# Patient Record
Sex: MALE | Race: BLACK OR AFRICAN AMERICAN | NOT HISPANIC OR LATINO | Employment: FULL TIME | URBAN - METROPOLITAN AREA
[De-identification: names, ages, dates, MRNs, and addresses within clinical notes are randomized per-mention and may not be internally consistent; named-entity substitution may affect disease eponyms.]

---

## 2017-11-20 ENCOUNTER — HISTORICAL (OUTPATIENT)
Dept: LAB | Facility: HOSPITAL | Age: 47
End: 2017-11-20

## 2017-11-20 LAB
ABS NEUT (OLG): 5.7 X10(3)/MCL (ref 2.1–9.2)
ALBUMIN SERPL-MCNC: 4 GM/DL (ref 3.4–5)
ALBUMIN/GLOB SERPL: 1.1 RATIO (ref 1.1–2)
ALP SERPL-CCNC: 69 UNIT/L (ref 46–116)
ALT SERPL-CCNC: 34 UNIT/L (ref 12–78)
APPEARANCE, UA: CLEAR
AST SERPL-CCNC: 32 UNIT/L (ref 15–37)
BACTERIA SPEC CULT: NORMAL
BASOPHILS NFR BLD AUTO: 1 % (ref 0–2)
BILIRUB SERPL-MCNC: 0.2 MG/DL (ref 0.2–1)
BILIRUB UR QL STRIP: NEGATIVE
BILIRUBIN DIRECT+TOT PNL SERPL-MCNC: 0.08 MG/DL (ref 0–0.2)
BILIRUBIN DIRECT+TOT PNL SERPL-MCNC: 0.16 MG/DL (ref 0–0.8)
BUN SERPL-MCNC: 16.9 MG/DL (ref 7–18)
CALCIUM SERPL-MCNC: 9.7 MG/DL (ref 8.5–10.1)
CHLORIDE SERPL-SCNC: 105 MMOL/L (ref 98–107)
CHOLEST SERPL-MCNC: 173 MG/DL (ref 0–200)
CHOLEST/HDLC SERPL: 3.7 {RATIO} (ref 0–5)
CO2 SERPL-SCNC: 25.8 MMOL/L (ref 21–32)
COLOR UR: YELLOW
CREAT SERPL-MCNC: 1.01 MG/DL (ref 0.6–1.3)
EOSINOPHIL # BLD AUTO: 0.2 X10(3)/MCL
EOSINOPHIL NFR BLD AUTO: 2 %
ERYTHROCYTE [DISTWIDTH] IN BLOOD BY AUTOMATED COUNT: 13.9 % (ref 11.5–17)
GLOBULIN SER-MCNC: 3.6 GM/DL (ref 2.4–3.5)
GLUCOSE (UA): NEGATIVE
GLUCOSE SERPL-MCNC: 106 MG/DL (ref 74–106)
HCT VFR BLD AUTO: 41.8 % (ref 42–52)
HDLC SERPL-MCNC: 47 MG/DL (ref 40–60)
HGB BLD-MCNC: 13.9 GM/DL (ref 14–18)
HGB UR QL STRIP: NEGATIVE
KETONES UR QL STRIP: NEGATIVE
LDLC SERPL CALC-MCNC: 105 MG/DL (ref 0–129)
LEUKOCYTE ESTERASE UR QL STRIP: NEGATIVE
LYMPHOCYTES # BLD AUTO: 2.5 X10(3)/MCL
LYMPHOCYTES NFR BLD AUTO: 28 % (ref 13–40)
MCH RBC QN AUTO: 27.4 PG (ref 27–31)
MCHC RBC AUTO-ENTMCNC: 33.2 GM/DL (ref 33–36)
MCV RBC AUTO: 82.6 FL (ref 80–94)
MONOCYTES # BLD AUTO: 0.6 X10(3)/MCL
MONOCYTES NFR BLD AUTO: 6 % (ref 2–11)
NEUTROPHILS # BLD AUTO: 5.7 X10(3)/MCL (ref 2.1–9.2)
NEUTROPHILS NFR BLD AUTO: 63 % (ref 47–80)
NITRITE UR QL STRIP: NEGATIVE
PH UR STRIP: 5.5 [PH] (ref 5–9)
PLATELET # BLD AUTO: 276 X10(3)/MCL (ref 130–400)
PMV BLD AUTO: 7.4 FL (ref 7.4–10.4)
POTASSIUM SERPL-SCNC: 4.2 MMOL/L (ref 3.5–5.1)
PROT SERPL-MCNC: 7.6 GM/DL (ref 6.4–8.2)
PROT UR QL STRIP: NEGATIVE
PSA SERPL-MCNC: 1.09 NG/ML (ref 0–4)
RBC # BLD AUTO: 5.06 X10(6)/MCL (ref 4.7–6.1)
RBC #/AREA URNS HPF: NORMAL /[HPF]
SODIUM SERPL-SCNC: 141 MMOL/L (ref 136–145)
SP GR UR STRIP: 1.02 (ref 1–1.03)
SQUAMOUS EPITHELIAL, UA: NORMAL
TRIGL SERPL-MCNC: 104 MG/DL
UROBILINOGEN UR STRIP-ACNC: 0.2
VLDLC SERPL CALC-MCNC: 21 MG/DL
WBC # SPEC AUTO: 9 X10(3)/MCL (ref 4.5–11.5)
WBC #/AREA URNS HPF: NORMAL /[HPF]

## 2018-11-09 ENCOUNTER — HISTORICAL (OUTPATIENT)
Dept: LAB | Facility: HOSPITAL | Age: 48
End: 2018-11-09

## 2018-11-09 LAB
ABS NEUT (OLG): 6.23 X10(3)/MCL (ref 2.1–9.2)
ALBUMIN SERPL-MCNC: 4.1 GM/DL (ref 3.4–5)
ALBUMIN/GLOB SERPL: 1.1 RATIO (ref 1.1–2)
ALP SERPL-CCNC: 70 UNIT/L (ref 46–116)
ALT SERPL-CCNC: 33 UNIT/L (ref 12–78)
APPEARANCE, UA: CLEAR
AST SERPL-CCNC: 20 UNIT/L (ref 15–37)
BACTERIA SPEC CULT: NORMAL
BASOPHILS # BLD AUTO: 0 X10(3)/MCL (ref 0–0.2)
BASOPHILS NFR BLD AUTO: 0 %
BILIRUB SERPL-MCNC: 0.3 MG/DL (ref 0.2–1)
BILIRUB UR QL STRIP: NEGATIVE
BILIRUBIN DIRECT+TOT PNL SERPL-MCNC: 0.08 MG/DL (ref 0–0.2)
BILIRUBIN DIRECT+TOT PNL SERPL-MCNC: 0.22 MG/DL (ref 0–0.8)
BUN SERPL-MCNC: 12.3 MG/DL (ref 7–18)
CALCIUM SERPL-MCNC: 9.6 MG/DL (ref 8.5–10.1)
CHLORIDE SERPL-SCNC: 103 MMOL/L (ref 98–107)
CHOLEST SERPL-MCNC: 194 MG/DL (ref 0–200)
CHOLEST/HDLC SERPL: 4.3 {RATIO} (ref 0–5)
CO2 SERPL-SCNC: 27.1 MMOL/L (ref 21–32)
COLOR UR: YELLOW
CREAT SERPL-MCNC: 0.93 MG/DL (ref 0.6–1.3)
EOSINOPHIL # BLD AUTO: 0.2 X10(3)/MCL (ref 0–0.9)
EOSINOPHIL NFR BLD AUTO: 2 %
ERYTHROCYTE [DISTWIDTH] IN BLOOD BY AUTOMATED COUNT: 12.9 % (ref 11.5–17)
GLOBULIN SER-MCNC: 3.7 GM/DL (ref 2.4–3.5)
GLUCOSE (UA): NEGATIVE
GLUCOSE SERPL-MCNC: 98 MG/DL (ref 74–106)
HCT VFR BLD AUTO: 42 % (ref 42–52)
HDLC SERPL-MCNC: 45 MG/DL (ref 40–60)
HGB BLD-MCNC: 13.6 GM/DL (ref 14–18)
HGB UR QL STRIP: NEGATIVE
IMM GRANULOCYTES # BLD AUTO: 0.03 % (ref 0–0.02)
IMM GRANULOCYTES NFR BLD AUTO: 0.3 % (ref 0–0.43)
KETONES UR QL STRIP: NEGATIVE
LDLC SERPL CALC-MCNC: 135 MG/DL (ref 0–129)
LEUKOCYTE ESTERASE UR QL STRIP: NEGATIVE
LYMPHOCYTES # BLD AUTO: 2.5 X10(3)/MCL (ref 0.6–4.6)
LYMPHOCYTES NFR BLD AUTO: 27 %
MCH RBC QN AUTO: 26.9 PG (ref 27–31)
MCHC RBC AUTO-ENTMCNC: 32.4 GM/DL (ref 33–36)
MCV RBC AUTO: 83 FL (ref 80–94)
MONOCYTES # BLD AUTO: 0.5 X10(3)/MCL (ref 0.1–1.3)
MONOCYTES NFR BLD AUTO: 5 %
NEUTROPHILS # BLD AUTO: 6.23 X10(3)/MCL (ref 1.4–7.9)
NEUTROPHILS NFR BLD AUTO: 66 %
NITRITE UR QL STRIP: NEGATIVE
PH UR STRIP: 5 [PH] (ref 5–9)
PLATELET # BLD AUTO: 323 X10(3)/MCL (ref 130–400)
PMV BLD AUTO: 9.4 FL (ref 9.4–12.4)
POTASSIUM SERPL-SCNC: 4.2 MMOL/L (ref 3.5–5.1)
PROT SERPL-MCNC: 7.8 GM/DL (ref 6.4–8.2)
PROT UR QL STRIP: NEGATIVE
PSA SERPL-MCNC: 1.56 NG/ML (ref 0–4)
RBC # BLD AUTO: 5.06 X10(6)/MCL (ref 4.7–6.1)
RBC #/AREA URNS HPF: NORMAL /[HPF]
SODIUM SERPL-SCNC: 139 MMOL/L (ref 136–145)
SP GR UR STRIP: >=1.03 (ref 1–1.03)
SQUAMOUS EPITHELIAL, UA: NORMAL
TRIGL SERPL-MCNC: 71 MG/DL
UROBILINOGEN UR STRIP-ACNC: 0.2
VLDLC SERPL CALC-MCNC: 14 MG/DL
WBC # SPEC AUTO: 9.5 X10(3)/MCL (ref 4.5–11.5)
WBC #/AREA URNS HPF: NORMAL /[HPF]

## 2018-11-15 ENCOUNTER — HISTORICAL (OUTPATIENT)
Dept: ADMINISTRATIVE | Facility: HOSPITAL | Age: 48
End: 2018-11-15

## 2020-08-13 ENCOUNTER — HISTORICAL (OUTPATIENT)
Dept: LAB | Facility: HOSPITAL | Age: 50
End: 2020-08-13

## 2020-08-13 LAB
ABS NEUT (OLG): 4.98 X10(3)/MCL (ref 2.1–9.2)
ALBUMIN SERPL-MCNC: 4.2 GM/DL (ref 3.4–5)
ALBUMIN/GLOB SERPL: 1.1 RATIO (ref 1.1–2)
ALP SERPL-CCNC: 63 UNIT/L (ref 46–116)
ALT SERPL-CCNC: 39 UNIT/L (ref 12–78)
APPEARANCE, UA: CLEAR
AST SERPL-CCNC: 23 UNIT/L (ref 15–37)
BACTERIA SPEC CULT: NORMAL
BASOPHILS # BLD AUTO: 0 X10(3)/MCL (ref 0–0.2)
BASOPHILS NFR BLD AUTO: 0 %
BILIRUB SERPL-MCNC: 0.3 MG/DL (ref 0.2–1)
BILIRUB UR QL STRIP: NEGATIVE
BILIRUBIN DIRECT+TOT PNL SERPL-MCNC: 0.14 MG/DL (ref 0–0.8)
BILIRUBIN DIRECT+TOT PNL SERPL-MCNC: 0.16 MG/DL (ref 0–0.2)
BUN SERPL-MCNC: 12.8 MG/DL (ref 7–18)
CALCIUM SERPL-MCNC: 9.3 MG/DL (ref 8.5–10.1)
CHLORIDE SERPL-SCNC: 104 MMOL/L (ref 98–107)
CHOLEST SERPL-MCNC: 200 MG/DL (ref 0–200)
CHOLEST/HDLC SERPL: 4.3 {RATIO} (ref 0–5)
CO2 SERPL-SCNC: 28 MMOL/L (ref 21–32)
COLOR UR: YELLOW
CREAT SERPL-MCNC: 0.84 MG/DL (ref 0.6–1.3)
EOSINOPHIL # BLD AUTO: 0.2 X10(3)/MCL (ref 0–0.9)
EOSINOPHIL NFR BLD AUTO: 2 %
ERYTHROCYTE [DISTWIDTH] IN BLOOD BY AUTOMATED COUNT: 13.8 % (ref 11.5–17)
GLOBULIN SER-MCNC: 3.9 GM/DL (ref 2.4–3.5)
GLUCOSE (UA): NEGATIVE
GLUCOSE SERPL-MCNC: 90 MG/DL (ref 74–106)
HCT VFR BLD AUTO: 41.4 % (ref 42–52)
HDLC SERPL-MCNC: 46 MG/DL (ref 40–60)
HGB BLD-MCNC: 13.3 GM/DL (ref 14–18)
HGB UR QL STRIP: NEGATIVE
IMM GRANULOCYTES # BLD AUTO: 0.02 % (ref 0–0.02)
IMM GRANULOCYTES NFR BLD AUTO: 0.2 % (ref 0–0.43)
KETONES UR QL STRIP: NEGATIVE
LDLC SERPL CALC-MCNC: 143 MG/DL (ref 0–129)
LEUKOCYTE ESTERASE UR QL STRIP: NEGATIVE
LYMPHOCYTES # BLD AUTO: 3.1 X10(3)/MCL (ref 0.6–4.6)
LYMPHOCYTES NFR BLD AUTO: 35 %
MCH RBC QN AUTO: 26.2 PG (ref 27–31)
MCHC RBC AUTO-ENTMCNC: 32.1 GM/DL (ref 33–36)
MCV RBC AUTO: 81.7 FL (ref 80–94)
MONOCYTES # BLD AUTO: 0.5 X10(3)/MCL (ref 0.1–1.3)
MONOCYTES NFR BLD AUTO: 6 %
NEUTROPHILS # BLD AUTO: 4.98 X10(3)/MCL (ref 1.4–7.9)
NEUTROPHILS NFR BLD AUTO: 56 %
NITRITE UR QL STRIP: NEGATIVE
PH UR STRIP: 5.5 [PH] (ref 5–9)
PLATELET # BLD AUTO: 315 X10(3)/MCL (ref 130–400)
PMV BLD AUTO: 10 FL (ref 9.4–12.4)
POTASSIUM SERPL-SCNC: 3.9 MMOL/L (ref 3.5–5.1)
PROT SERPL-MCNC: 8.1 GM/DL (ref 6.4–8.2)
PROT UR QL STRIP: NEGATIVE
PSA SERPL-MCNC: 0.75 NG/ML (ref 0–4)
RBC # BLD AUTO: 5.07 X10(6)/MCL (ref 4.7–6.1)
RBC #/AREA URNS HPF: NORMAL /[HPF]
SODIUM SERPL-SCNC: 140 MMOL/L (ref 136–145)
SP GR UR STRIP: >=1.03 (ref 1–1.03)
SQUAMOUS EPITHELIAL, UA: NORMAL
TRIGL SERPL-MCNC: 57 MG/DL
UROBILINOGEN UR STRIP-ACNC: 0.2
VLDLC SERPL CALC-MCNC: 11 MG/DL
WBC # SPEC AUTO: 8.9 X10(3)/MCL (ref 4.5–11.5)
WBC #/AREA URNS HPF: NORMAL /[HPF]

## 2020-10-28 LAB — CRC RECOMMENDATION EXT: NORMAL

## 2021-12-02 ENCOUNTER — HISTORICAL (OUTPATIENT)
Dept: LAB | Facility: HOSPITAL | Age: 51
End: 2021-12-02

## 2021-12-02 LAB
ABS NEUT (OLG): 5.2 X10(3)/MCL (ref 2.1–9.2)
ALBUMIN SERPL-MCNC: 4.1 GM/DL (ref 3.5–5)
ALBUMIN/GLOB SERPL: 1.2 RATIO (ref 1.1–2)
ALP SERPL-CCNC: 74 UNIT/L (ref 40–150)
ALT SERPL-CCNC: 29 UNIT/L (ref 0–55)
APPEARANCE, UA: CLEAR
AST SERPL-CCNC: 21 UNIT/L (ref 5–34)
BACTERIA SPEC CULT: NORMAL
BASOPHILS # BLD AUTO: 0 X10(3)/MCL (ref 0–0.2)
BASOPHILS NFR BLD AUTO: 1 %
BILIRUB SERPL-MCNC: 0.3 MG/DL
BILIRUB UR QL STRIP: NEGATIVE
BILIRUBIN DIRECT+TOT PNL SERPL-MCNC: 0.1 MG/DL (ref 0–0.5)
BILIRUBIN DIRECT+TOT PNL SERPL-MCNC: 0.2 MG/DL (ref 0–0.8)
BUN SERPL-MCNC: 11.1 MG/DL (ref 8.4–25.7)
CALCIUM SERPL-MCNC: 9.6 MG/DL (ref 8.7–10.5)
CHLORIDE SERPL-SCNC: 106 MMOL/L (ref 98–107)
CHOLEST SERPL-MCNC: 194 MG/DL
CHOLEST/HDLC SERPL: 5 {RATIO} (ref 0–5)
CO2 SERPL-SCNC: 25 MMOL/L (ref 22–29)
COLOR UR: YELLOW
CREAT SERPL-MCNC: 0.9 MG/DL (ref 0.73–1.18)
EOSINOPHIL # BLD AUTO: 0.2 X10(3)/MCL (ref 0–0.9)
EOSINOPHIL NFR BLD AUTO: 2 %
ERYTHROCYTE [DISTWIDTH] IN BLOOD BY AUTOMATED COUNT: 13.5 % (ref 11.5–17)
GLOBULIN SER-MCNC: 3.4 GM/DL (ref 2.4–3.5)
GLUCOSE (UA): NEGATIVE
GLUCOSE SERPL-MCNC: 113 MG/DL (ref 74–100)
HCT VFR BLD AUTO: 40.9 % (ref 42–52)
HDLC SERPL-MCNC: 43 MG/DL (ref 35–60)
HGB BLD-MCNC: 13.2 GM/DL (ref 14–18)
HGB UR QL STRIP: NEGATIVE
IMM GRANULOCYTES # BLD AUTO: 0.01 % (ref 0–0.02)
IMM GRANULOCYTES NFR BLD AUTO: 0.1 % (ref 0–0.43)
KETONES UR QL STRIP: NEGATIVE
LDLC SERPL CALC-MCNC: 125 MG/DL (ref 50–140)
LEUKOCYTE ESTERASE UR QL STRIP: NEGATIVE
LYMPHOCYTES # BLD AUTO: 2.6 X10(3)/MCL (ref 0.6–4.6)
LYMPHOCYTES NFR BLD AUTO: 30 %
MCH RBC QN AUTO: 26 PG (ref 27–31)
MCHC RBC AUTO-ENTMCNC: 32.3 GM/DL (ref 33–36)
MCV RBC AUTO: 80.7 FL (ref 80–94)
MONOCYTES # BLD AUTO: 0.6 X10(3)/MCL (ref 0.1–1.3)
MONOCYTES NFR BLD AUTO: 6 %
NEUTROPHILS # BLD AUTO: 5.2 X10(3)/MCL (ref 1.4–7.9)
NEUTROPHILS NFR BLD AUTO: 60 %
NITRITE UR QL STRIP: NEGATIVE
PH UR STRIP: 6 [PH] (ref 5–9)
PLATELET # BLD AUTO: 331 X10(3)/MCL (ref 130–400)
PMV BLD AUTO: 9.2 FL (ref 9.4–12.4)
POTASSIUM SERPL-SCNC: 4.3 MMOL/L (ref 3.5–5.1)
PROT SERPL-MCNC: 7.5 GM/DL (ref 6.4–8.3)
PROT UR QL STRIP: NEGATIVE
PSA SERPL-MCNC: 1.48 NG/ML
RBC # BLD AUTO: 5.07 X10(6)/MCL (ref 4.7–6.1)
RBC #/AREA URNS HPF: NORMAL /[HPF]
SODIUM SERPL-SCNC: 139 MMOL/L (ref 136–145)
SP GR UR STRIP: >=1.03 (ref 1–1.03)
SQUAMOUS EPITHELIAL, UA: NORMAL
TRIGL SERPL-MCNC: 128 MG/DL (ref 34–140)
UROBILINOGEN UR STRIP-ACNC: 0.2
VLDLC SERPL CALC-MCNC: 26 MG/DL
WBC # SPEC AUTO: 8.6 X10(3)/MCL (ref 4.5–11.5)
WBC #/AREA URNS HPF: NORMAL /[HPF]

## 2022-04-10 ENCOUNTER — HISTORICAL (OUTPATIENT)
Dept: ADMINISTRATIVE | Facility: HOSPITAL | Age: 52
End: 2022-04-10

## 2022-04-28 VITALS
OXYGEN SATURATION: 98 % | WEIGHT: 220.56 LBS | HEIGHT: 72 IN | SYSTOLIC BLOOD PRESSURE: 123 MMHG | BODY MASS INDEX: 29.87 KG/M2 | DIASTOLIC BLOOD PRESSURE: 84 MMHG

## 2022-06-22 RX ORDER — LOSARTAN POTASSIUM 25 MG/1
25 TABLET ORAL DAILY
COMMUNITY
Start: 2021-10-13 | End: 2022-07-22 | Stop reason: SDUPTHER

## 2022-06-23 ENCOUNTER — OFFICE VISIT (OUTPATIENT)
Dept: INTERNAL MEDICINE | Facility: CLINIC | Age: 52
End: 2022-06-23
Payer: COMMERCIAL

## 2022-06-23 VITALS
RESPIRATION RATE: 18 BRPM | WEIGHT: 218.81 LBS | HEIGHT: 72 IN | DIASTOLIC BLOOD PRESSURE: 78 MMHG | HEART RATE: 64 BPM | TEMPERATURE: 97 F | SYSTOLIC BLOOD PRESSURE: 130 MMHG | BODY MASS INDEX: 29.64 KG/M2

## 2022-06-23 DIAGNOSIS — Z00.00 WELLNESS EXAMINATION: ICD-10-CM

## 2022-06-23 DIAGNOSIS — K40.90 HERNIA, INGUINAL, LEFT: Primary | ICD-10-CM

## 2022-06-23 DIAGNOSIS — K40.90 NON-RECURRENT UNILATERAL INGUINAL HERNIA WITHOUT OBSTRUCTION OR GANGRENE: Primary | ICD-10-CM

## 2022-06-23 DIAGNOSIS — D64.9 ANEMIA, UNSPECIFIED TYPE: ICD-10-CM

## 2022-06-23 PROCEDURE — 3075F SYST BP GE 130 - 139MM HG: CPT | Mod: CPTII,,, | Performed by: INTERNAL MEDICINE

## 2022-06-23 PROCEDURE — 3075F PR MOST RECENT SYSTOLIC BLOOD PRESS GE 130-139MM HG: ICD-10-PCS | Mod: CPTII,,, | Performed by: INTERNAL MEDICINE

## 2022-06-23 PROCEDURE — 4010F ACE/ARB THERAPY RXD/TAKEN: CPT | Mod: CPTII,,, | Performed by: INTERNAL MEDICINE

## 2022-06-23 PROCEDURE — 1160F RVW MEDS BY RX/DR IN RCRD: CPT | Mod: CPTII,,, | Performed by: INTERNAL MEDICINE

## 2022-06-23 PROCEDURE — 1159F PR MEDICATION LIST DOCUMENTED IN MEDICAL RECORD: ICD-10-PCS | Mod: CPTII,,, | Performed by: INTERNAL MEDICINE

## 2022-06-23 PROCEDURE — 3008F BODY MASS INDEX DOCD: CPT | Mod: CPTII,,, | Performed by: INTERNAL MEDICINE

## 2022-06-23 PROCEDURE — 3008F PR BODY MASS INDEX (BMI) DOCUMENTED: ICD-10-PCS | Mod: CPTII,,, | Performed by: INTERNAL MEDICINE

## 2022-06-23 PROCEDURE — 3078F PR MOST RECENT DIASTOLIC BLOOD PRESSURE < 80 MM HG: ICD-10-PCS | Mod: CPTII,,, | Performed by: INTERNAL MEDICINE

## 2022-06-23 PROCEDURE — 1159F MED LIST DOCD IN RCRD: CPT | Mod: CPTII,,, | Performed by: INTERNAL MEDICINE

## 2022-06-23 PROCEDURE — 3078F DIAST BP <80 MM HG: CPT | Mod: CPTII,,, | Performed by: INTERNAL MEDICINE

## 2022-06-23 PROCEDURE — 99214 PR OFFICE/OUTPT VISIT, EST, LEVL IV, 30-39 MIN: ICD-10-PCS | Mod: ,,, | Performed by: INTERNAL MEDICINE

## 2022-06-23 PROCEDURE — 1160F PR REVIEW ALL MEDS BY PRESCRIBER/CLIN PHARMACIST DOCUMENTED: ICD-10-PCS | Mod: CPTII,,, | Performed by: INTERNAL MEDICINE

## 2022-06-23 PROCEDURE — 99214 OFFICE O/P EST MOD 30 MIN: CPT | Mod: ,,, | Performed by: INTERNAL MEDICINE

## 2022-06-23 PROCEDURE — 4010F PR ACE/ARB THEARPY RXD/TAKEN: ICD-10-PCS | Mod: CPTII,,, | Performed by: INTERNAL MEDICINE

## 2022-06-24 ENCOUNTER — PATIENT OUTREACH (OUTPATIENT)
Dept: ADMINISTRATIVE | Facility: HOSPITAL | Age: 52
End: 2022-06-24
Payer: COMMERCIAL

## 2022-06-24 NOTE — PROGRESS NOTES
Population Health Outreach. The following record(s)  below were uploaded for Health Maintenance .      10/28/2020 COLONOSCOPY

## 2022-07-22 ENCOUNTER — TELEPHONE (OUTPATIENT)
Dept: INTERNAL MEDICINE | Facility: CLINIC | Age: 52
End: 2022-07-22
Payer: COMMERCIAL

## 2022-07-22 DIAGNOSIS — I10 HYPERTENSION, UNSPECIFIED TYPE: Primary | ICD-10-CM

## 2022-07-22 RX ORDER — LOSARTAN POTASSIUM 25 MG/1
25 TABLET ORAL DAILY
Qty: 90 TABLET | Refills: 1 | Status: SHIPPED | OUTPATIENT
Start: 2022-07-22 | End: 2023-01-17

## 2022-07-22 NOTE — TELEPHONE ENCOUNTER
----- Message from Valentina Salter sent at 7/22/2022  8:20 AM CDT -----  Regarding: refill  Needs losartan 25 mg refilled at [facility]. His number is [phone]

## 2022-12-05 ENCOUNTER — TELEPHONE (OUTPATIENT)
Dept: INTERNAL MEDICINE | Facility: CLINIC | Age: 52
End: 2022-12-05
Payer: COMMERCIAL

## 2023-01-26 ENCOUNTER — TELEPHONE (OUTPATIENT)
Dept: INTERNAL MEDICINE | Facility: CLINIC | Age: 53
End: 2023-01-26
Payer: COMMERCIAL

## 2023-01-26 NOTE — TELEPHONE ENCOUNTER
----- Message from Sherri Clark sent at 1/26/2023 10:37 AM CST -----  Regarding: Sciatica Nerve Pain  Pt called and stated he went to Emergency room for Sciatica pain Tuesday morning states the medication that was prescribed is not working still in a lot of pain. Pt asked for nurse to please call so he can explain medications and what's recommended.. please advise     [phone] John Paul

## 2023-01-27 ENCOUNTER — OFFICE VISIT (OUTPATIENT)
Dept: INTERNAL MEDICINE | Facility: CLINIC | Age: 53
End: 2023-01-27
Payer: COMMERCIAL

## 2023-01-27 VITALS
OXYGEN SATURATION: 97 % | SYSTOLIC BLOOD PRESSURE: 138 MMHG | DIASTOLIC BLOOD PRESSURE: 82 MMHG | RESPIRATION RATE: 20 BRPM | HEIGHT: 72 IN | BODY MASS INDEX: 29.28 KG/M2 | WEIGHT: 216.19 LBS | HEART RATE: 76 BPM

## 2023-01-27 DIAGNOSIS — M54.32 SCIATICA OF LEFT SIDE: Primary | ICD-10-CM

## 2023-01-27 DIAGNOSIS — M79.2 NEUROPATHIC PAIN: ICD-10-CM

## 2023-01-27 PROCEDURE — 4010F PR ACE/ARB THEARPY RXD/TAKEN: ICD-10-PCS | Mod: CPTII,,,

## 2023-01-27 PROCEDURE — 1160F RVW MEDS BY RX/DR IN RCRD: CPT | Mod: CPTII,,,

## 2023-01-27 PROCEDURE — 3075F SYST BP GE 130 - 139MM HG: CPT | Mod: CPTII,,,

## 2023-01-27 PROCEDURE — 99214 PR OFFICE/OUTPT VISIT, EST, LEVL IV, 30-39 MIN: ICD-10-PCS | Mod: 25,,,

## 2023-01-27 PROCEDURE — 3008F BODY MASS INDEX DOCD: CPT | Mod: CPTII,,,

## 2023-01-27 PROCEDURE — 96372 PR INJECTION,THERAP/PROPH/DIAG2ST, IM OR SUBCUT: ICD-10-PCS | Mod: ,,,

## 2023-01-27 PROCEDURE — 4010F ACE/ARB THERAPY RXD/TAKEN: CPT | Mod: CPTII,,,

## 2023-01-27 PROCEDURE — 3079F DIAST BP 80-89 MM HG: CPT | Mod: CPTII,,,

## 2023-01-27 PROCEDURE — 3075F PR MOST RECENT SYSTOLIC BLOOD PRESS GE 130-139MM HG: ICD-10-PCS | Mod: CPTII,,,

## 2023-01-27 PROCEDURE — 96372 THER/PROPH/DIAG INJ SC/IM: CPT | Mod: ,,,

## 2023-01-27 PROCEDURE — 99214 OFFICE O/P EST MOD 30 MIN: CPT | Mod: 25,,,

## 2023-01-27 PROCEDURE — 3079F PR MOST RECENT DIASTOLIC BLOOD PRESSURE 80-89 MM HG: ICD-10-PCS | Mod: CPTII,,,

## 2023-01-27 PROCEDURE — 1160F PR REVIEW ALL MEDS BY PRESCRIBER/CLIN PHARMACIST DOCUMENTED: ICD-10-PCS | Mod: CPTII,,,

## 2023-01-27 PROCEDURE — 1159F MED LIST DOCD IN RCRD: CPT | Mod: CPTII,,,

## 2023-01-27 PROCEDURE — 1159F PR MEDICATION LIST DOCUMENTED IN MEDICAL RECORD: ICD-10-PCS | Mod: CPTII,,,

## 2023-01-27 PROCEDURE — 3008F PR BODY MASS INDEX (BMI) DOCUMENTED: ICD-10-PCS | Mod: CPTII,,,

## 2023-01-27 RX ORDER — KETOROLAC TROMETHAMINE 10 MG/1
10 TABLET, FILM COATED ORAL EVERY 6 HOURS PRN
COMMUNITY
Start: 2023-01-24 | End: 2023-02-03

## 2023-01-27 RX ORDER — KETOROLAC TROMETHAMINE 15 MG/ML
15 INJECTION, SOLUTION INTRAMUSCULAR; INTRAVENOUS
Status: COMPLETED | OUTPATIENT
Start: 2023-01-27 | End: 2023-01-27

## 2023-01-27 RX ORDER — METHYLPREDNISOLONE 4 MG/1
TABLET ORAL
COMMUNITY
Start: 2023-01-24 | End: 2023-02-03

## 2023-01-27 RX ORDER — TRAMADOL HYDROCHLORIDE 50 MG/1
50 TABLET ORAL DAILY PRN
Qty: 7 TABLET | Refills: 0 | Status: SHIPPED | OUTPATIENT
Start: 2023-01-27 | End: 2023-02-03

## 2023-01-27 RX ORDER — GABAPENTIN 300 MG/1
300 CAPSULE ORAL NIGHTLY
Qty: 30 CAPSULE | Refills: 0 | Status: SHIPPED | OUTPATIENT
Start: 2023-01-27 | End: 2023-02-03 | Stop reason: SDUPTHER

## 2023-01-27 RX ORDER — DEXAMETHASONE SODIUM PHOSPHATE 4 MG/ML
4 INJECTION, SOLUTION INTRA-ARTICULAR; INTRALESIONAL; INTRAMUSCULAR; INTRAVENOUS; SOFT TISSUE
Status: COMPLETED | OUTPATIENT
Start: 2023-01-27 | End: 2023-01-27

## 2023-01-27 RX ORDER — METHOCARBAMOL 500 MG/1
TABLET, FILM COATED ORAL
COMMUNITY
Start: 2023-01-24 | End: 2023-11-17

## 2023-01-27 RX ADMIN — KETOROLAC TROMETHAMINE 15 MG: 15 INJECTION, SOLUTION INTRAMUSCULAR; INTRAVENOUS at 09:01

## 2023-01-27 RX ADMIN — DEXAMETHASONE SODIUM PHOSPHATE 4 MG: 4 INJECTION, SOLUTION INTRA-ARTICULAR; INTRALESIONAL; INTRAMUSCULAR; INTRAVENOUS; SOFT TISSUE at 09:01

## 2023-01-27 NOTE — LETTER
January 27, 2023      [facility]  [address]  [place] [address]  Phone: [phone]  Fax: [phone]       Patient: John Paul Mak   YOB: 1970  Date of Visit: 01/27/2023    To Whom It May Concern:    Dai Mak  was at [facility] on 01/27/2023. The patient may return to work/school on 02/03/2023 with light duty and avoiding heavy lifting restrictions. If you have any questions or concerns, or if I can be of further assistance, please do not hesitate to contact me.    Sincerely,    VASILE Pope

## 2023-01-27 NOTE — PROGRESS NOTES
Patient ID: John Paul Mak is a 52 y.o. male.    Chief Complaint: left sciatica pain (Burning, aching and throbbing pain from left hip radiating into his left buttock and thigh. Symptoms started 1/23/23. No injury noted. Went to [facility] ER on 1/24/23 due to the pain. betamethasone and toradol injection was given. Injections provided no relief. )    52-year-old male with hypertension, inguinal hernia here today for requested visit.  Last visit referred to Dr. Anderson for a left inguinal hernia.  Accompanied today by wife.  Since last visit, patient recently reported to ER 01/24/2022 for sciatica pain.  X-ray lumbar spine negative for acute findings and noting only multilevel facet disease. Subsequently prescribed Toradol 10 mg q.i.d., Robaxin 500 mg b.i.d. p.r.n., and Medrol Dosepak.  Today presents with complaints of ongoing left lumbar paraspinous pain that radiates to left hip and thigh.  Denies extremity weakness or new incontinence.  Reports 100% compliance with current prescription regimen, however without much relief.  Discussion had with patient regarding muscle strains and sciatica. Informed may need MRI, however patient wishing to proceed with conservative treatment 1st.  Otherwise no other acute medical concerns noted.     GI: Dr. Giles   PCP Dr. Sy      MEDICAL HISTORY:    Past Medical History:   Diagnosis Date    Essential (primary) hypertension     Inguinal hernia       Past Surgical History:   Procedure Laterality Date    COLONOSCOPY  10/28/2020    Harshal Dempsey    INGUINAL HERNIA REPAIR Left       Social History     Tobacco Use    Smoking status: Never    Smokeless tobacco: Never   Substance Use Topics    Alcohol use: Yes    Drug use: Never          Health Maintenance Due   Topic Date Due    Hepatitis C Screening  Never done    HIV Screening  Never done    COVID-19 Vaccine (3 - Booster for Moderna series) 05/26/2021    Influenza Vaccine (1) Never done          Patient Care  Team:  Kumar Sy MD as PCP - General (Internal Medicine)      Review of Systems   Constitutional:  Negative for fatigue and fever.   HENT:  Negative for congestion, rhinorrhea, sore throat and trouble swallowing.    Eyes:  Negative for redness and visual disturbance.   Respiratory:  Negative for cough, chest tightness and shortness of breath.    Cardiovascular:  Negative for chest pain and palpitations.   Gastrointestinal:  Negative for abdominal pain, constipation, diarrhea, nausea and vomiting.   Genitourinary:  Negative for dysuria, flank pain, frequency and urgency.   Musculoskeletal:  Positive for back pain (with left sided radiation), gait problem (due to pain) and myalgias. Negative for arthralgias.   Skin:  Negative for rash and wound.   Neurological:  Negative for facial asymmetry, speech difficulty, weakness and headaches.   All other systems reviewed and are negative.    Objective:   /82 (BP Location: Right arm, Patient Position: Lying, BP Method: Large (Manual))   Pulse 76   Resp 20   Ht 6' (1.829 m)   Wt 98.1 kg (216 lb 3.2 oz)   SpO2 97%   BMI 29.32 kg/m²      Physical Exam  Constitutional:       General: He is not in acute distress.     Appearance: Normal appearance.   HENT:      Right Ear: Tympanic membrane, ear canal and external ear normal.      Left Ear: Tympanic membrane, ear canal and external ear normal.      Nose: Nose normal.      Mouth/Throat:      Mouth: Mucous membranes are moist.      Pharynx: Oropharynx is clear.   Eyes:      Extraocular Movements: Extraocular movements intact.      Conjunctiva/sclera: Conjunctivae normal.      Pupils: Pupils are equal, round, and reactive to light.   Cardiovascular:      Rate and Rhythm: Normal rate and regular rhythm.      Pulses: Normal pulses.      Heart sounds: Normal heart sounds. No murmur heard.    No gallop.   Pulmonary:      Effort: Pulmonary effort is normal.      Breath sounds: Normal breath sounds. No wheezing.    Abdominal:      General: Bowel sounds are normal. There is no distension.      Palpations: Abdomen is soft. There is no mass.      Tenderness: There is no abdominal tenderness. There is no guarding.   Musculoskeletal:      Cervical back: Normal.      Thoracic back: Normal.      Lumbar back: Tenderness present. No edema, signs of trauma or bony tenderness. Decreased range of motion.        Back:       Right lower leg: Normal.      Comments: Discomfort discomfort with range of motion of left lower extremity   Skin:     General: Skin is warm and dry.   Neurological:      Mental Status: He is alert. Mental status is at baseline.      Sensory: No sensory deficit.      Motor: No weakness.         Assessment:       ICD-10-CM ICD-9-CM   1. Sciatica of left side  M54.32 724.3   2. Neuropathic pain  M79.2 729.2        Plan:     Problem List Items Addressed This Visit          Neuro    Neuropathic pain    Relevant Medications    gabapentin (NEURONTIN) 300 MG capsule    ketorolac injection 15 mg (Completed)    dexAMETHasone injection 4 mg (Completed)    traMADoL (ULTRAM) 50 mg tablet    Other Relevant Orders    Ambulatory referral/consult to Physical/Occupational Therapy       Orthopedic    Sciatica of left side - Primary     -XR lumbar negative for acute and noting only multilevel facet disease  -reports pain not well controlled   -obtain x-ray pelvis  -recently prescribed Toradol 10 mg q.i.d., Robaxin 500 mg b.i.d. p.r.n., and Medrol Dosepak., continue all and complete Medrol Dosepak  -IM dexamethasone/Toradol in office   -initiate short course Ultram x7 days (7 tablets only)  -initiate trial on gabapentin 300 mg q.h.s.  -referred to physical therapy for sciatica possible dry needling  -MRI discussed, patient wishing to continue conservative treatment for now  -encouraged to present to ER if symptoms worsen           Relevant Medications    gabapentin (NEURONTIN) 300 MG capsule    ketorolac injection 15 mg (Completed)     dexAMETHasone injection 4 mg (Completed)    traMADoL (ULTRAM) 50 mg tablet    Other Relevant Orders    Ambulatory referral/consult to Physical/Occupational Therapy          Follow up for Previously scheduled and PRN if need.   -plan specifics discussed above    Orders Placed This Encounter    Ambulatory referral/consult to Physical/Occupational Therapy    gabapentin (NEURONTIN) 300 MG capsule    ketorolac injection 15 mg    dexAMETHasone injection 4 mg    traMADoL (ULTRAM) 50 mg tablet        Medication List with Changes/Refills   New Medications    GABAPENTIN (NEURONTIN) 300 MG CAPSULE    Take 1 capsule (300 mg total) by mouth nightly.    TRAMADOL (ULTRAM) 50 MG TABLET    Take 1 tablet (50 mg total) by mouth daily as needed for Pain.   Current Medications    KETOROLAC (TORADOL) 10 MG TABLET    Take 10 mg by mouth every 6 (six) hours as needed.    LOSARTAN (COZAAR) 25 MG TABLET    TAKE 1 TABLET(25 MG) BY MOUTH EVERY DAY    METHOCARBAMOL (ROBAXIN) 500 MG TAB    SMARTSI Tablet(s) By Mouth Morning-Night    METHYLPREDNISOLONE (MEDROL DOSEPACK) 4 MG TABLET    FOLLOW PACKAGE DIRECTIONS

## 2023-01-28 NOTE — ASSESSMENT & PLAN NOTE
-XR lumbar negative for acute and noting only multilevel facet disease  -reports pain not well controlled   -obtain x-ray pelvis  -recently prescribed Toradol 10 mg q.i.d., Robaxin 500 mg b.i.d. p.r.n., and Medrol Dosepak., continue all and complete Medrol Dosepak  -IM dexamethasone/Toradol in office   -initiate short course Ultram x7 days (7 tablets only)  -initiate trial on gabapentin 300 mg q.h.s.  -referred to physical therapy for sciatica possible dry needling  -MRI discussed, patient wishing to continue conservative treatment for now  -encouraged to present to ER if symptoms worsen

## 2023-02-03 ENCOUNTER — OFFICE VISIT (OUTPATIENT)
Dept: INTERNAL MEDICINE | Facility: CLINIC | Age: 53
End: 2023-02-03
Payer: COMMERCIAL

## 2023-02-03 VITALS
WEIGHT: 209.81 LBS | OXYGEN SATURATION: 99 % | DIASTOLIC BLOOD PRESSURE: 82 MMHG | RESPIRATION RATE: 18 BRPM | HEART RATE: 76 BPM | SYSTOLIC BLOOD PRESSURE: 138 MMHG | TEMPERATURE: 98 F | BODY MASS INDEX: 28.45 KG/M2

## 2023-02-03 DIAGNOSIS — M54.16 LUMBAR RADICULOPATHY: Primary | ICD-10-CM

## 2023-02-03 DIAGNOSIS — M79.2 NEUROPATHIC PAIN: ICD-10-CM

## 2023-02-03 DIAGNOSIS — M54.9 DORSALGIA, UNSPECIFIED: ICD-10-CM

## 2023-02-03 PROCEDURE — 3075F SYST BP GE 130 - 139MM HG: CPT | Mod: CPTII,,,

## 2023-02-03 PROCEDURE — 99214 PR OFFICE/OUTPT VISIT, EST, LEVL IV, 30-39 MIN: ICD-10-PCS | Mod: ,,,

## 2023-02-03 PROCEDURE — 1159F PR MEDICATION LIST DOCUMENTED IN MEDICAL RECORD: ICD-10-PCS | Mod: CPTII,,,

## 2023-02-03 PROCEDURE — 1159F MED LIST DOCD IN RCRD: CPT | Mod: CPTII,,,

## 2023-02-03 PROCEDURE — 99214 OFFICE O/P EST MOD 30 MIN: CPT | Mod: ,,,

## 2023-02-03 PROCEDURE — 3008F PR BODY MASS INDEX (BMI) DOCUMENTED: ICD-10-PCS | Mod: CPTII,,,

## 2023-02-03 PROCEDURE — 3079F PR MOST RECENT DIASTOLIC BLOOD PRESSURE 80-89 MM HG: ICD-10-PCS | Mod: CPTII,,,

## 2023-02-03 PROCEDURE — 1160F RVW MEDS BY RX/DR IN RCRD: CPT | Mod: CPTII,,,

## 2023-02-03 PROCEDURE — 3075F PR MOST RECENT SYSTOLIC BLOOD PRESS GE 130-139MM HG: ICD-10-PCS | Mod: CPTII,,,

## 2023-02-03 PROCEDURE — 1160F PR REVIEW ALL MEDS BY PRESCRIBER/CLIN PHARMACIST DOCUMENTED: ICD-10-PCS | Mod: CPTII,,,

## 2023-02-03 PROCEDURE — 4010F ACE/ARB THERAPY RXD/TAKEN: CPT | Mod: CPTII,,,

## 2023-02-03 PROCEDURE — 3008F BODY MASS INDEX DOCD: CPT | Mod: CPTII,,,

## 2023-02-03 PROCEDURE — 3079F DIAST BP 80-89 MM HG: CPT | Mod: CPTII,,,

## 2023-02-03 PROCEDURE — 4010F PR ACE/ARB THEARPY RXD/TAKEN: ICD-10-PCS | Mod: CPTII,,,

## 2023-02-03 RX ORDER — CELECOXIB 100 MG/1
100 CAPSULE ORAL 2 TIMES DAILY
Qty: 14 CAPSULE | Refills: 0 | Status: SHIPPED | OUTPATIENT
Start: 2023-02-03 | End: 2023-02-03

## 2023-02-03 RX ORDER — HYDROCODONE BITARTRATE AND ACETAMINOPHEN 5; 325 MG/1; MG/1
1 TABLET ORAL EVERY 12 HOURS PRN
Qty: 14 TABLET | Refills: 0 | Status: SHIPPED | OUTPATIENT
Start: 2023-02-03 | End: 2023-02-10

## 2023-02-03 RX ORDER — GABAPENTIN 300 MG/1
300 CAPSULE ORAL 2 TIMES DAILY
Qty: 60 CAPSULE | Refills: 0 | Status: SHIPPED | OUTPATIENT
Start: 2023-02-03 | End: 2023-02-23

## 2023-02-03 RX ORDER — CELECOXIB 100 MG/1
100 CAPSULE ORAL 2 TIMES DAILY
Qty: 42 CAPSULE | Refills: 0 | Status: SHIPPED | OUTPATIENT
Start: 2023-02-03 | End: 2023-02-07

## 2023-02-03 NOTE — PROGRESS NOTES
Patient ID: John Paul Mak is a 52 y.o. male.    Chief Complaint: Sciatic Pain  (Pt still in pain, would like to look into issue more, not sleeping, pain pills are not helping, )    52-year-old male with hypertension, inguinal hernia here today for requested visit.  Accompanied today by wife.    Patient recently presented to ER 01/24/2022 for sciatica pain.  X-ray lumbar spine negative for acute findings and noting only multilevel facet disease.  Prescribed Toradol 10 mg q.i.d., Robaxin 500 mg b.i.d. p.r.n., and Medrol Dosepak at ER.  Subsequently seen by us (01/27/2023) for post ER visit with with persistent left lumbar paraspinous pain that radiated to left hip and thigh.  X-ray pelvis completed indicating no acute pathology or abnormality of pelvis or hip.  Subsequently referred physical therapy for sciatica/dry needling.  Given IM dexamethasone/Toradol,  prescribed gabapentin 300 mg nightly and tramadol 50 mg p.r.n.  Today presents again with complaints of ongoing left lumbar paraspinous pain that radiating to left hip and thigh.  Denies extremity weakness or new incontinence.   Reports 100% compliance with current prescription regimen and therapy.  Stating only mild improvement. Discussion had with patient regarding need to obtain MRI to further delineate, however instructed to go to ER if pain was significant.  Explained limitations of working up  in outpatient setting, however patient not wanting to go to higher level  and wishing to proceed with conservative treatment still despite educational intervention.  Ultimately MRI lumbar and referral to Neurosurgery placed.  Also adding Celebrex b.i.d., Norco 5 mg p.r.n., and increasing gabapentin from 300 mg nightly to 300 mg b.i.d.      GI: Dr. Giles   PCP Dr. Sy      MEDICAL HISTORY:    Past Medical History:   Diagnosis Date    Essential (primary) hypertension     Inguinal hernia       Past Surgical History:   Procedure Laterality Date     COLONOSCOPY  10/28/2020    Harshal [facility]    INGUINAL HERNIA REPAIR Left       Social History     Tobacco Use    Smoking status: Never    Smokeless tobacco: Never   Substance Use Topics    Alcohol use: Yes    Drug use: Never          Health Maintenance Due   Topic Date Due    Hepatitis C Screening  Never done    HIV Screening  Never done    COVID-19 Vaccine (3 - Booster for Moderna series) 05/26/2021    Influenza Vaccine (1) Never done          Patient Care Team:  Kumar Sy MD as PCP - General (Internal Medicine)      Review of Systems   Constitutional:  Negative for fatigue and fever.   HENT:  Negative for congestion, rhinorrhea, sore throat and trouble swallowing.    Eyes:  Negative for redness and visual disturbance.   Respiratory:  Negative for cough, chest tightness and shortness of breath.    Cardiovascular:  Negative for chest pain and palpitations.   Gastrointestinal:  Negative for abdominal pain, constipation, diarrhea, nausea and vomiting.   Genitourinary:  Negative for dysuria, flank pain, frequency and urgency.   Musculoskeletal:  Positive for back pain (with left sided radiation), gait problem (due to pain) and myalgias. Negative for arthralgias.   Skin:  Negative for rash and wound.   Neurological:  Negative for facial asymmetry, speech difficulty, weakness and headaches.   All other systems reviewed and are negative.    Objective:   /82 (BP Location: Left arm, Patient Position: Lying, BP Method: Small (Automatic))   Pulse 76   Temp 98.1 °F (36.7 °C) (Temporal)   Resp 18   Wt 95.2 kg (209 lb 12.8 oz)   SpO2 99%   BMI 28.45 kg/m²      Physical Exam  Constitutional:       General: He is not in acute distress.     Appearance: Normal appearance.   HENT:      Right Ear: Tympanic membrane, ear canal and external ear normal.      Left Ear: Tympanic membrane, ear canal and external ear normal.      Nose: Nose normal.      Mouth/Throat:      Mouth: Mucous membranes are moist.       Pharynx: Oropharynx is clear.   Eyes:      Extraocular Movements: Extraocular movements intact.      Conjunctiva/sclera: Conjunctivae normal.      Pupils: Pupils are equal, round, and reactive to light.   Cardiovascular:      Rate and Rhythm: Normal rate and regular rhythm.      Pulses: Normal pulses.      Heart sounds: Normal heart sounds. No murmur heard.    No gallop.   Pulmonary:      Effort: Pulmonary effort is normal.      Breath sounds: Normal breath sounds. No wheezing.   Abdominal:      General: Bowel sounds are normal. There is no distension.      Palpations: Abdomen is soft. There is no mass.      Tenderness: There is no abdominal tenderness. There is no guarding.   Musculoskeletal:      Cervical back: Normal.      Thoracic back: Normal.      Lumbar back: Tenderness present. No edema, signs of trauma or bony tenderness. Decreased range of motion.        Back:       Right lower leg: Normal.      Comments: Discomfort discomfort with range of motion of left lower extremity   Skin:     General: Skin is warm and dry.   Neurological:      Mental Status: He is alert. Mental status is at baseline.      Sensory: No sensory deficit.      Motor: No weakness.         Assessment:       ICD-10-CM ICD-9-CM   1. Lumbar radiculopathy  M54.16 724.4   2. Dorsalgia, unspecified  M54.9 724.5   3. Neuropathic pain  M79.2 729.2        Plan:     Problem List Items Addressed This Visit          Neuro    Neuropathic pain    Relevant Medications    gabapentin (NEURONTIN) 300 MG capsule    Lumbar radiculopathy - Primary        -XR lumbar negative for acute and noting only multilevel facet disease  -x-ray pelvis negative for acute finding or abnormality  -reports pain not well controlled , obtain MRI lumbar  -referred to Neurosurgery  -currently with physical therapy, continue  -IM Toradol in office offered, declined by patient  -initiate Celebrex 100 mg b.i.d. p.r.n.  -initiate short course [place] x7 days   -currently on gabapentin  300 mg q.h.s., increase to 300 mg b.i.d..  -referred to physical therapy for sciatica possible dry needling  -MRI discussed, patient wishing to continue conservative treatment for now  -encouraged to present to ER if symptoms worsen              Relevant Medications    gabapentin (NEURONTIN) 300 MG capsule    HYDROcodone-acetaminophen (NORCO) 5-325 mg per tablet    celecoxib (CELEBREX) 100 MG capsule    Other Relevant Orders    Ambulatory referral/consult to Neurosurgery    MRI Lumbar Spine Without Contrast       Orthopedic    Dorsalgia, unspecified    Relevant Medications    gabapentin (NEURONTIN) 300 MG capsule    HYDROcodone-acetaminophen (NORCO) 5-325 mg per tablet    celecoxib (CELEBREX) 100 MG capsule    Other Relevant Orders    Ambulatory referral/consult to Neurosurgery    MRI Lumbar Spine Without Contrast          Follow up for Previously scheduled and Present to ER/Urgent Care if symtoms worsen.   -plan specifics discussed above    Orders Placed This Encounter    MRI Lumbar Spine Without Contrast    Ambulatory referral/consult to Neurosurgery    gabapentin (NEURONTIN) 300 MG capsule    HYDROcodone-acetaminophen (NORCO) 5-325 mg per tablet    celecoxib (CELEBREX) 100 MG capsule        Medication List with Changes/Refills   New Medications    CELECOXIB (CELEBREX) 100 MG CAPSULE    Take 1 capsule (100 mg total) by mouth 2 (two) times daily. for 21 days    HYDROCODONE-ACETAMINOPHEN (NORCO) 5-325 MG PER TABLET    Take 1 tablet by mouth every 12 (twelve) hours as needed for Pain.   Current Medications    LOSARTAN (COZAAR) 25 MG TABLET    TAKE 1 TABLET(25 MG) BY MOUTH EVERY DAY    METHOCARBAMOL (ROBAXIN) 500 MG TAB    SMARTSI Tablet(s) By Mouth Morning-Night   Changed and/or Refilled Medications    Modified Medication Previous Medication    GABAPENTIN (NEURONTIN) 300 MG CAPSULE gabapentin (NEURONTIN) 300 MG capsule       Take 1 capsule (300 mg total) by mouth 2 (two) times daily.    Take 1 capsule (300 mg  total) by mouth nightly.   Discontinued Medications    KETOROLAC (TORADOL) 10 MG TABLET    Take 10 mg by mouth every 6 (six) hours as needed.    METHYLPREDNISOLONE (MEDROL DOSEPACK) 4 MG TABLET    FOLLOW PACKAGE DIRECTIONS    TRAMADOL (ULTRAM) 50 MG TABLET    Take 1 tablet (50 mg total) by mouth daily as needed for Pain.

## 2023-02-03 NOTE — LETTER
February 3, 2023      [facility]  [address]  [place] [address]  Phone: [phone]  Fax: [phone]       Patient: John Paul Mak   YOB: 1970  Date of Visit: 02/03/2023    To Whom It May Concern:    Dai Mak  was at [facility] on 02/03/2023. The patient may return to work/school on 02/20/23 with light duty and frequent rest restrictions. If you have any questions or concerns, or if I can be of further assistance, please do not hesitate to contact me.    Sincerely,    VASILE Pope

## 2023-02-03 NOTE — ASSESSMENT & PLAN NOTE
-XR lumbar negative for acute and noting only multilevel facet disease  -x-ray pelvis negative for acute finding or abnormality  -reports pain not well controlled , obtain MRI lumbar  -referred to Neurosurgery  -currently with physical therapy, continue  -IM Toradol in office offered, declined by patient  -initiate Celebrex 100 mg b.i.d. p.r.n.  -initiate short course [place] x7 days   -currently on gabapentin 300 mg q.h.s., increase to 300 mg b.i.d..  -referred to physical therapy for sciatica possible dry needling  -MRI discussed, patient wishing to continue conservative treatment for now  -encouraged to present to ER if symptoms worsen

## 2023-02-07 DIAGNOSIS — M54.9 DORSALGIA, UNSPECIFIED: ICD-10-CM

## 2023-02-07 DIAGNOSIS — M54.16 LUMBAR RADICULOPATHY: ICD-10-CM

## 2023-02-07 RX ORDER — CELECOXIB 100 MG/1
CAPSULE ORAL
Qty: 14 CAPSULE | Refills: 1 | Status: SHIPPED | OUTPATIENT
Start: 2023-02-07 | End: 2023-11-17

## 2023-02-08 ENCOUNTER — TELEPHONE (OUTPATIENT)
Dept: INTERNAL MEDICINE | Facility: CLINIC | Age: 53
End: 2023-02-08
Payer: COMMERCIAL

## 2023-02-08 RX ORDER — ALPRAZOLAM 0.5 MG/1
0.5 TABLET ORAL ONCE AS NEEDED
Qty: 1 TABLET | Refills: 0 | Status: SHIPPED | OUTPATIENT
Start: 2023-02-08 | End: 2024-02-15

## 2023-02-08 NOTE — TELEPHONE ENCOUNTER
Medications Ordered This Encounter   Medications    ALPRAZolam (XANAX) 0.5 MG tablet     Sig: Take 1 tablet (0.5 mg total) by mouth once as needed for Anxiety (Prior to MRI, do not take with narcotic or muscle relaxer).     Dispense:  1 tablet     Refill:  0     Prior to MRI, do not take with narcotic or muscle relaxer     DO NOT TAKE WITH MUSCLE RELAXER OR NARCOTIC.  ONLY 1 TABLET BEING DISPENSED.

## 2023-02-08 NOTE — TELEPHONE ENCOUNTER
Spoke to Pt wife, referral will be resent    show <-------- Click here to INCLUDE CoVID-19 Discharge Instructions

## 2023-02-08 NOTE — TELEPHONE ENCOUNTER
Spoke to Pt, aware of medication for MRI and resent referral, FMLA paperwork faxed and confirmation received, stated understanding

## 2023-02-08 NOTE — TELEPHONE ENCOUNTER
----- Message from Luz Chung sent at 2/8/2023  8:43 AM CST -----  Regarding: call  Patient's wife called and would like a return call #[phone]

## 2023-02-13 ENCOUNTER — TELEPHONE (OUTPATIENT)
Dept: INTERNAL MEDICINE | Facility: CLINIC | Age: 53
End: 2023-02-13
Payer: COMMERCIAL

## 2023-02-13 ENCOUNTER — TELEPHONE (OUTPATIENT)
Dept: NEUROSURGERY | Facility: CLINIC | Age: 53
End: 2023-02-13
Payer: COMMERCIAL

## 2023-02-13 DIAGNOSIS — M54.9 DORSALGIA: Primary | ICD-10-CM

## 2023-02-13 DIAGNOSIS — M54.16 LUMBAR RADICULOPATHY: ICD-10-CM

## 2023-02-13 DIAGNOSIS — M54.16 LUMBAR RADICULOPATHY: Primary | ICD-10-CM

## 2023-02-13 NOTE — TELEPHONE ENCOUNTER
----- Message from Valentina Salter sent at 2/10/2023  9:18 AM CST -----  Regarding: needs another dr besides [facility]  Patient called and said he would like a referral to another doctor besides [facility] because his ins is not covering it. He needs callback at [phone]

## 2023-02-13 NOTE — TELEPHONE ENCOUNTER
----- Message from VASILE Pope sent at 2/12/2023  2:06 PM CST -----  -Please notify patient he has multi-level lumbar bulging discs.  One of his bulging disc looks to be compressing on his nerve causing the current pain.  However, also noted to have a possible local swelling vs  focal fatty spinal muscle vs possible mass lesion around that area as well.  He is recommended to repeat an MRI Lumbar however this time WITH contrast to further look into this spot/possible lesion in particular.   -  If in agreeance please enter order for MRI lumbar WITH Contrast; diagnosis spinal lesion and back pain  -Since we referred him to Neurosurgery, he may wait for their formal evaluation/interpretation of imaging if you would like 1st

## 2023-02-13 NOTE — TELEPHONE ENCOUNTER
"----- Message from Luba Ruff MA sent at 2/13/2023 10:53 AM CST -----  Regarding: REFERRAL PROCESS- lumbar  This patient is being referred to Dr. Overton by Dr. Sy for dorsalgia (low back pain). Reviewing MRI report, " L2-3: Far left lateral disc protrusion extends 6-7 mm posteriorly and contributes to severe left neural foraminal stenosis with impingement upon the exiting left L2 nerve root.  There is low T1, high T2 signal surrounding the nerve root which appears somewhat masslike measuring 11 mm." Please review imaging and advise on scheduling. Thank you!    "

## 2023-02-13 NOTE — TELEPHONE ENCOUNTER
Give him an appointment with me or Dr. Overton whichever is sooner.  If it is with me, obtain lumbar x-rays with flex/ext views.      Ask the patient to call us when he has completed the Lumbar MRI with contrast.  I will need to review.

## 2023-02-13 NOTE — TELEPHONE ENCOUNTER
Spoke with him. Advised of below. Scheduled him with Tabatha for 3/22/23 at 1:00, Xray at [facility] for 12:15. Patient did request a sooner apt (Dr. Overton does not have one) so I advised that I did put him on the wait list for a sooner apt. Patient verbalized understanding. Patient has had no new testing besides MRI 2/2023. Denies seeing any other doctors and has not had any surgeries on back/neck. He has done PT at [facility]. I did advise that I will need these notes prior to apt so I will e-mail release form and he will e-mail it back

## 2023-02-16 ENCOUNTER — TELEPHONE (OUTPATIENT)
Dept: INTERNAL MEDICINE | Facility: CLINIC | Age: 53
End: 2023-02-16
Payer: COMMERCIAL

## 2023-02-16 NOTE — TELEPHONE ENCOUNTER
Pt calling about [facility] paperwork, we did receive it, will be filled out and faxed, Pt to see Neuro this month

## 2023-02-16 NOTE — TELEPHONE ENCOUNTER
----- Message from Valentina Salter sent at 2/16/2023  9:19 AM CST -----  Regarding: call pt  Patient wants you to call him at [phone]

## 2023-02-23 ENCOUNTER — TELEPHONE (OUTPATIENT)
Dept: INTERNAL MEDICINE | Facility: CLINIC | Age: 53
End: 2023-02-23
Payer: COMMERCIAL

## 2023-02-23 NOTE — TELEPHONE ENCOUNTER
----- Message from Valentina Salter sent at 2/23/2023 11:31 AM CST -----  Regarding: wants to speak to nurse  Call wife Dana at [phone]

## 2023-02-23 NOTE — TELEPHONE ENCOUNTER
Pt wife stated MRI was completed, cyst was found, MD stated maybe benign, will release Pt to work as of Monday 2/27/2023 with restrictions, will call back if anything is needed from us, DARREN

## 2023-03-15 NOTE — TELEPHONE ENCOUNTER
I tried calling the patient to move up his appointment to 3/20/23 with Tabatha.  When reviewing the chart you asked to be notified when the patient had his MRI.  The MRI with contrast was performed on 3/7/23 and available in his chart.  ANTHONY

## 2023-03-16 NOTE — TELEPHONE ENCOUNTER
I spoke to the patient to move up his appointment.  He stated that he is seeing someone else and asked taht I cancel his appointment.  BH

## 2023-07-25 DIAGNOSIS — I10 HYPERTENSION, UNSPECIFIED TYPE: ICD-10-CM

## 2023-07-25 RX ORDER — LOSARTAN POTASSIUM 25 MG/1
TABLET ORAL
Qty: 90 TABLET | Refills: 3 | Status: SHIPPED | OUTPATIENT
Start: 2023-07-25

## 2023-08-25 DIAGNOSIS — M79.2 NEUROPATHIC PAIN: ICD-10-CM

## 2023-08-25 DIAGNOSIS — M54.16 LUMBAR RADICULOPATHY: ICD-10-CM

## 2023-08-25 DIAGNOSIS — M54.9 DORSALGIA, UNSPECIFIED: ICD-10-CM

## 2023-08-25 RX ORDER — GABAPENTIN 300 MG/1
CAPSULE ORAL
Qty: 90 CAPSULE | Refills: 1 | Status: SHIPPED | OUTPATIENT
Start: 2023-08-25 | End: 2023-11-17

## 2023-11-01 ENCOUNTER — TELEPHONE (OUTPATIENT)
Dept: INTERNAL MEDICINE | Facility: CLINIC | Age: 53
End: 2023-11-01
Payer: COMMERCIAL

## 2023-11-01 DIAGNOSIS — Z12.5 SCREENING FOR PROSTATE CANCER: ICD-10-CM

## 2023-11-01 DIAGNOSIS — Z00.00 WELLNESS EXAMINATION: Primary | ICD-10-CM

## 2023-11-01 DIAGNOSIS — E55.9 VITAMIN D DEFICIENCY: ICD-10-CM

## 2023-11-01 DIAGNOSIS — I10 HYPERTENSION, UNSPECIFIED TYPE: ICD-10-CM

## 2023-11-01 NOTE — TELEPHONE ENCOUNTER
----- Message from Ashley Kumar sent at 11/1/2023  9:23 AM CDT -----  Regarding: labs  Caller is requesting to schedule their Lab appointment prior to annual appointment.  Order is not listed in EPIC.  Please enter order and contact patient to schedule.    Name of Caller: pt    Preferred Date and Time of Labs:     Date of EPP Appointment:11/17    Where would they like the lab performed? [facility]    Would the patient rather a call back or a response via [facility]? C/b    Best Call Back Number:[phone]    Additional Information: pt scheduled annual please put fasting lab orders in chart      Thank you

## 2023-11-09 ENCOUNTER — LAB VISIT (OUTPATIENT)
Dept: LAB | Facility: HOSPITAL | Age: 53
End: 2023-11-09
Attending: INTERNAL MEDICINE
Payer: COMMERCIAL

## 2023-11-09 DIAGNOSIS — Z12.5 SCREENING FOR PROSTATE CANCER: ICD-10-CM

## 2023-11-09 DIAGNOSIS — E55.9 VITAMIN D DEFICIENCY: ICD-10-CM

## 2023-11-09 DIAGNOSIS — Z00.00 WELLNESS EXAMINATION: ICD-10-CM

## 2023-11-09 DIAGNOSIS — I10 HYPERTENSION, UNSPECIFIED TYPE: ICD-10-CM

## 2023-11-09 LAB
ALBUMIN SERPL-MCNC: 4 G/DL (ref 3.5–5)
ALBUMIN/GLOB SERPL: 1.2 RATIO (ref 1.1–2)
ALP SERPL-CCNC: 67 UNIT/L (ref 40–150)
ALT SERPL-CCNC: 26 UNIT/L (ref 0–55)
APPEARANCE UR: CLEAR
AST SERPL-CCNC: 22 UNIT/L (ref 5–34)
BACTERIA #/AREA URNS AUTO: NORMAL /HPF
BASOPHILS # BLD AUTO: 0.05 X10(3)/MCL
BASOPHILS NFR BLD AUTO: 0.7 %
BILIRUB SERPL-MCNC: 0.5 MG/DL
BILIRUB UR QL STRIP.AUTO: NEGATIVE
BUN SERPL-MCNC: 8.1 MG/DL (ref 8.4–25.7)
CALCIUM SERPL-MCNC: 9.4 MG/DL (ref 8.4–10.2)
CHLORIDE SERPL-SCNC: 107 MMOL/L (ref 98–107)
CHOLEST SERPL-MCNC: 189 MG/DL
CHOLEST/HDLC SERPL: 5 {RATIO} (ref 0–5)
CO2 SERPL-SCNC: 24 MMOL/L (ref 22–29)
COLOR UR AUTO: YELLOW
CREAT SERPL-MCNC: 0.82 MG/DL (ref 0.73–1.18)
DEPRECATED CALCIDIOL+CALCIFEROL SERPL-MC: 11.5 NG/ML (ref 30–80)
EOSINOPHIL # BLD AUTO: 0.19 X10(3)/MCL (ref 0–0.9)
EOSINOPHIL NFR BLD AUTO: 2.7 %
ERYTHROCYTE [DISTWIDTH] IN BLOOD BY AUTOMATED COUNT: 13.3 % (ref 11.5–17)
GFR SERPLBLD CREATININE-BSD FMLA CKD-EPI: >60 MLS/MIN/1.73/M2
GLOBULIN SER-MCNC: 3.4 GM/DL (ref 2.4–3.5)
GLUCOSE SERPL-MCNC: 106 MG/DL (ref 74–100)
GLUCOSE UR QL STRIP.AUTO: NEGATIVE
HCT VFR BLD AUTO: 41.1 % (ref 42–52)
HDLC SERPL-MCNC: 36 MG/DL (ref 35–60)
HGB BLD-MCNC: 13 G/DL (ref 14–18)
IMM GRANULOCYTES # BLD AUTO: 0.02 X10(3)/MCL (ref 0–0.04)
IMM GRANULOCYTES NFR BLD AUTO: 0.3 %
KETONES UR QL STRIP.AUTO: NEGATIVE
LDLC SERPL CALC-MCNC: 138 MG/DL (ref 50–140)
LEUKOCYTE ESTERASE UR QL STRIP.AUTO: NEGATIVE
LYMPHOCYTES # BLD AUTO: 2.02 X10(3)/MCL (ref 0.6–4.6)
LYMPHOCYTES NFR BLD AUTO: 28.5 %
MCH RBC QN AUTO: 26.4 PG (ref 27–31)
MCHC RBC AUTO-ENTMCNC: 31.6 G/DL (ref 33–36)
MCV RBC AUTO: 83.4 FL (ref 80–94)
MONOCYTES # BLD AUTO: 0.32 X10(3)/MCL (ref 0.1–1.3)
MONOCYTES NFR BLD AUTO: 4.5 %
NEUTROPHILS # BLD AUTO: 4.5 X10(3)/MCL (ref 2.1–9.2)
NEUTROPHILS NFR BLD AUTO: 63.3 %
NITRITE UR QL STRIP.AUTO: NEGATIVE
PH UR STRIP.AUTO: 6.5 [PH]
PLATELET # BLD AUTO: 300 X10(3)/MCL (ref 130–400)
PMV BLD AUTO: 9.1 FL (ref 7.4–10.4)
POTASSIUM SERPL-SCNC: 3.8 MMOL/L (ref 3.5–5.1)
PROT SERPL-MCNC: 7.4 GM/DL (ref 6.4–8.3)
PROT UR QL STRIP.AUTO: NEGATIVE
PSA SERPL-MCNC: 1.42 NG/ML
RBC # BLD AUTO: 4.93 X10(6)/MCL (ref 4.7–6.1)
RBC #/AREA URNS AUTO: NORMAL /HPF
RBC UR QL AUTO: NEGATIVE
SODIUM SERPL-SCNC: 140 MMOL/L (ref 136–145)
SP GR UR STRIP.AUTO: >=1.03 (ref 1–1.03)
SQUAMOUS #/AREA URNS AUTO: NORMAL /HPF
TRIGL SERPL-MCNC: 77 MG/DL (ref 34–140)
TSH SERPL-ACNC: 2.85 UIU/ML (ref 0.35–4.94)
UROBILINOGEN UR STRIP-ACNC: 0.2
VLDLC SERPL CALC-MCNC: 15 MG/DL
WBC # SPEC AUTO: 7.1 X10(3)/MCL (ref 4.5–11.5)
WBC #/AREA URNS AUTO: NORMAL /HPF

## 2023-11-09 PROCEDURE — 80061 LIPID PANEL: CPT

## 2023-11-09 PROCEDURE — 80053 COMPREHEN METABOLIC PANEL: CPT

## 2023-11-09 PROCEDURE — 36415 COLL VENOUS BLD VENIPUNCTURE: CPT

## 2023-11-09 PROCEDURE — 84153 ASSAY OF PSA TOTAL: CPT

## 2023-11-09 PROCEDURE — 85025 COMPLETE CBC W/AUTO DIFF WBC: CPT

## 2023-11-09 PROCEDURE — 84443 ASSAY THYROID STIM HORMONE: CPT

## 2023-11-09 PROCEDURE — 81001 URINALYSIS AUTO W/SCOPE: CPT

## 2023-11-09 PROCEDURE — 82306 VITAMIN D 25 HYDROXY: CPT

## 2023-11-16 PROBLEM — R79.89 LOW VITAMIN D LEVEL: Status: ACTIVE | Noted: 2023-11-16

## 2023-11-16 PROBLEM — R79.89 LOW VITAMIN D LEVEL: Chronic | Status: ACTIVE | Noted: 2023-11-16

## 2023-11-17 ENCOUNTER — OFFICE VISIT (OUTPATIENT)
Dept: INTERNAL MEDICINE | Facility: CLINIC | Age: 53
End: 2023-11-17
Payer: COMMERCIAL

## 2023-11-17 VITALS
WEIGHT: 213.81 LBS | OXYGEN SATURATION: 98 % | HEART RATE: 60 BPM | BODY MASS INDEX: 32.4 KG/M2 | SYSTOLIC BLOOD PRESSURE: 132 MMHG | HEIGHT: 68 IN | DIASTOLIC BLOOD PRESSURE: 66 MMHG

## 2023-11-17 DIAGNOSIS — Z00.00 WELLNESS EXAMINATION: Primary | ICD-10-CM

## 2023-11-17 DIAGNOSIS — D64.9 ANEMIA, UNSPECIFIED TYPE: Chronic | ICD-10-CM

## 2023-11-17 DIAGNOSIS — R79.89 LOW VITAMIN D LEVEL: ICD-10-CM

## 2023-11-17 DIAGNOSIS — I10 ESSENTIAL (PRIMARY) HYPERTENSION: Chronic | ICD-10-CM

## 2023-11-17 PROCEDURE — 3008F PR BODY MASS INDEX (BMI) DOCUMENTED: ICD-10-PCS | Mod: CPTII,,, | Performed by: INTERNAL MEDICINE

## 2023-11-17 PROCEDURE — 1159F PR MEDICATION LIST DOCUMENTED IN MEDICAL RECORD: ICD-10-PCS | Mod: CPTII,,, | Performed by: INTERNAL MEDICINE

## 2023-11-17 PROCEDURE — 99396 PR PREVENTIVE VISIT,EST,40-64: ICD-10-PCS | Mod: ,,, | Performed by: INTERNAL MEDICINE

## 2023-11-17 PROCEDURE — 3078F DIAST BP <80 MM HG: CPT | Mod: CPTII,,, | Performed by: INTERNAL MEDICINE

## 2023-11-17 PROCEDURE — 4010F ACE/ARB THERAPY RXD/TAKEN: CPT | Mod: CPTII,,, | Performed by: INTERNAL MEDICINE

## 2023-11-17 PROCEDURE — 1160F RVW MEDS BY RX/DR IN RCRD: CPT | Mod: CPTII,,, | Performed by: INTERNAL MEDICINE

## 2023-11-17 PROCEDURE — 99396 PREV VISIT EST AGE 40-64: CPT | Mod: ,,, | Performed by: INTERNAL MEDICINE

## 2023-11-17 PROCEDURE — 3075F PR MOST RECENT SYSTOLIC BLOOD PRESS GE 130-139MM HG: ICD-10-PCS | Mod: CPTII,,, | Performed by: INTERNAL MEDICINE

## 2023-11-17 PROCEDURE — 3078F PR MOST RECENT DIASTOLIC BLOOD PRESSURE < 80 MM HG: ICD-10-PCS | Mod: CPTII,,, | Performed by: INTERNAL MEDICINE

## 2023-11-17 PROCEDURE — 3008F BODY MASS INDEX DOCD: CPT | Mod: CPTII,,, | Performed by: INTERNAL MEDICINE

## 2023-11-17 PROCEDURE — 1159F MED LIST DOCD IN RCRD: CPT | Mod: CPTII,,, | Performed by: INTERNAL MEDICINE

## 2023-11-17 PROCEDURE — 4010F PR ACE/ARB THEARPY RXD/TAKEN: ICD-10-PCS | Mod: CPTII,,, | Performed by: INTERNAL MEDICINE

## 2023-11-17 PROCEDURE — 3075F SYST BP GE 130 - 139MM HG: CPT | Mod: CPTII,,, | Performed by: INTERNAL MEDICINE

## 2023-11-17 PROCEDURE — 1160F PR REVIEW ALL MEDS BY PRESCRIBER/CLIN PHARMACIST DOCUMENTED: ICD-10-PCS | Mod: CPTII,,, | Performed by: INTERNAL MEDICINE

## 2023-11-17 NOTE — ASSESSMENT & PLAN NOTE
Blood pressure is well controlled   Continue medications   Discussed all results   Vitamin-D level is low he will start 2000 units daily.

## 2023-11-17 NOTE — PROGRESS NOTES
Kumar Sy MD        PATIENT NAME: John Paul Mak  : 1970  DATE: 23  MRN: [id number]      Billing Provider: Kumar Sy MD  Level of Service: [place] PREVENTIVE VISIT,EST,40-64  Patient PCP Information       Provider PCP Type    Kumar Sy MD General            Reason for Visit / Chief Complaint: Annual Exam (Wellness/)       Update PCP  Update Chief Complaint         History of Present Illness / Problem Focused Workflow     John Paul Mak presents to the clinic with Annual Exam (Wellness/)     Adi is here for his annual wellness exam   He is doing well with no problems.  His colonoscopy is up-to-date        Review of Systems   Review of Systems   Constitutional: Negative.    HENT: Negative.     Eyes: Negative.    Respiratory: Negative.     Cardiovascular: Negative.    Gastrointestinal: Negative.    Endocrine: Negative.    Genitourinary: Negative.    Musculoskeletal: Negative.    Integumentary:  Negative.   Neurological: Negative.    Psychiatric/Behavioral: Negative.          Medical / Social / Family History     Past Medical History:   Diagnosis Date    Essential (primary) hypertension        Past Surgical History:   Procedure Laterality Date    COLONOSCOPY  10/28/2020    Harshal Dempsey    INGUINAL HERNIA REPAIR Left        Social History  Mr. Mak  reports that he has never smoked. He has never used smokeless tobacco. He reports current alcohol use. He reports that he does not use drugs.    Family History  Mr.'s Mak  family history includes Diabetes in his brother, mother, and sister; Hypertension in his brother and sister.    Medications and Allergies     Medications  Outpatient Medications Marked as Taking for the 23 encounter (Office Visit) with Kumar Sy MD   Medication Sig Dispense Refill    ALPRAZolam (XANAX) 0.5 MG tablet Take 1 tablet (0.5 mg total) by mouth once as needed for Anxiety (Prior to MRI, do not take with narcotic or  muscle relaxer). 1 tablet 0    losartan (COZAAR) 25 MG tablet TAKE 1 TABLET(25 MG) BY MOUTH EVERY DAY 90 tablet 3    [DISCONTINUED] celecoxib (CELEBREX) 100 MG capsule TAKE 1 CAPSULE(100 MG) BY MOUTH TWICE DAILY FOR 7 DAYS 14 capsule 1    [DISCONTINUED] gabapentin (NEURONTIN) 300 MG capsule TAKE 1 CAPSULE(300 MG) BY MOUTH EVERY NIGHT 90 capsule 1    [DISCONTINUED] methocarbamoL (ROBAXIN) 500 MG Tab SMARTSI Tablet(s) By Mouth Morning-Night         Allergies  Review of patient's allergies indicates:  No Known Allergies    Physical Examination     Vitals:    23 1029   BP: 132/66   Pulse: 60     Physical Exam  Vitals reviewed.   Constitutional:       Appearance: Normal appearance.   HENT:      Head: Normocephalic.      Right Ear: Tympanic membrane normal.      Left Ear: Tympanic membrane normal.      Nose: Nose normal.      Mouth/Throat:      Pharynx: Oropharynx is clear.   Eyes:      Extraocular Movements: Extraocular movements intact.      Pupils: Pupils are equal, round, and reactive to light.   Cardiovascular:      Rate and Rhythm: Normal rate and regular rhythm.      Pulses: Normal pulses.      Heart sounds: Normal heart sounds.   Pulmonary:      Effort: Pulmonary effort is normal.      Breath sounds: Normal breath sounds.   Abdominal:      General: Abdomen is flat. Bowel sounds are normal.      Palpations: Abdomen is soft.   Genitourinary:     Testes: Normal.      Prostate: Normal.      Rectum: Normal.   Musculoskeletal:         General: Normal range of motion.      Cervical back: Normal range of motion.   Skin:     General: Skin is warm and dry.   Neurological:      General: No focal deficit present.      Mental Status: He is alert and oriented to person, place, and time.   Psychiatric:         Mood and Affect: Mood normal.         Behavior: Behavior normal.          Assessment and Plan (including Health Maintenance)      Problem List  Smart Sets  Document Outside HM   :    Plan:    ICD-10-CM ICD-9-CM    1. Wellness examination  Z00.00 V70.0   2. Essential (primary) hypertension  I10 401.9   3. Low vitamin D level  R79.89 790.6   4. Anemia, unspecified type  D64.9 285.9       Problem List Items Addressed This Visit          Cardiac/Vascular    Essential (primary) hypertension (Chronic)     Blood pressure is well controlled   Continue medications   Discussed all results   Vitamin-D level is low he will start 2000 units daily.            Oncology    Anemia, unspecified (Chronic)       Other    Low vitamin D level (Chronic)     Other Visit Diagnoses       Wellness examination    -  Primary                   Health Maintenance Due   Topic Date Due    Hepatitis C Screening  Never done    HIV Screening  Never done    TETANUS VACCINE  Never done    Hemoglobin A1c (Diabetic Prevention Screening)  Never done    Shingles Vaccine (1 of 2) Never done    Influenza Vaccine (1) Never done    COVID-19 Vaccine (3 - 2023-24 season) 09/01/2023       Problem List Items Addressed This Visit          Cardiac/Vascular    Essential (primary) hypertension (Chronic)    Current Assessment & Plan     Blood pressure is well controlled   Continue medications   Discussed all results   Vitamin-D level is low he will start 2000 units daily.            Oncology    Anemia, unspecified (Chronic)       Other    Low vitamin D level (Chronic)     Other Visit Diagnoses       Wellness examination    -  Primary            Health Maintenance Topics with due status: Not Due       Topic Last Completion Date    Colorectal Cancer Screening 10/28/2020    Lipid Panel 11/09/2023       Future Appointments   Date Time Provider Department Center   11/25/2024  8:00 AM Kumar Sy MD [place] [address]            Signature:  Kumar Sy MD  [facility]  [address] [facility] [address]    Date of encounter: 11/17/23

## 2023-12-06 ENCOUNTER — TELEPHONE (OUTPATIENT)
Dept: INTERNAL MEDICINE | Facility: CLINIC | Age: 53
End: 2023-12-06
Payer: COMMERCIAL

## 2023-12-06 DIAGNOSIS — K64.9 HEMORRHOIDS, UNSPECIFIED HEMORRHOID TYPE: Primary | ICD-10-CM

## 2023-12-06 RX ORDER — HYDROCORTISONE 1 %
CREAM (GRAM) TOPICAL 4 TIMES DAILY
Qty: 120 G | Refills: 0 | Status: SHIPPED | OUTPATIENT
Start: 2023-12-06 | End: 2024-02-15

## 2023-12-06 NOTE — TELEPHONE ENCOUNTER
Pt has been having burning and itching in rectal area and Pt wants to know can we send him out something for hemorrhoids, he tried preparation H and its not working. Please Advise....

## 2023-12-06 NOTE — TELEPHONE ENCOUNTER
----- Message from Christine Avalos sent at 12/6/2023 11:19 AM CST -----  .Type:  Needs Medical Advice    Who Called: pt  Symptoms (please be specific):    How long has patient had these symptoms:    Pharmacy name and phone #:    Would the patient rather a call back or a response via [facility]?   Best Call Back Number: [phone]  Additional Information: pt asking for nurse or pcp to call him and he said its about what he going thru

## 2024-02-14 PROBLEM — R10.84 GENERALIZED ABDOMINAL PAIN: Status: ACTIVE | Noted: 2024-02-14

## 2024-02-15 ENCOUNTER — OFFICE VISIT (OUTPATIENT)
Dept: INTERNAL MEDICINE | Facility: CLINIC | Age: 54
End: 2024-02-15
Payer: COMMERCIAL

## 2024-02-15 ENCOUNTER — LAB VISIT (OUTPATIENT)
Dept: LAB | Facility: HOSPITAL | Age: 54
End: 2024-02-15
Attending: INTERNAL MEDICINE
Payer: COMMERCIAL

## 2024-02-15 VITALS
BODY MASS INDEX: 32.96 KG/M2 | SYSTOLIC BLOOD PRESSURE: 130 MMHG | HEART RATE: 62 BPM | HEIGHT: 67 IN | WEIGHT: 210 LBS | TEMPERATURE: 98 F | OXYGEN SATURATION: 98 % | DIASTOLIC BLOOD PRESSURE: 80 MMHG

## 2024-02-15 DIAGNOSIS — R10.84 GENERALIZED ABDOMINAL PAIN: Primary | ICD-10-CM

## 2024-02-15 DIAGNOSIS — R10.84 GENERALIZED ABDOMINAL PAIN: ICD-10-CM

## 2024-02-15 DIAGNOSIS — K57.92 DIVERTICULITIS: ICD-10-CM

## 2024-02-15 DIAGNOSIS — R10.32 LEFT LOWER QUADRANT ABDOMINAL PAIN: ICD-10-CM

## 2024-02-15 LAB
ALBUMIN SERPL-MCNC: 4.2 G/DL (ref 3.5–5)
ALBUMIN/GLOB SERPL: 1.4 RATIO (ref 1.1–2)
ALP SERPL-CCNC: 72 UNIT/L (ref 40–150)
ALT SERPL-CCNC: 19 UNIT/L (ref 0–55)
APPEARANCE UR: CLEAR
AST SERPL-CCNC: 21 UNIT/L (ref 5–34)
BACTERIA #/AREA URNS AUTO: ABNORMAL /HPF
BASOPHILS # BLD AUTO: 0.06 X10(3)/MCL
BASOPHILS NFR BLD AUTO: 0.6 %
BILIRUB SERPL-MCNC: 0.4 MG/DL
BILIRUB UR QL STRIP.AUTO: NEGATIVE
BUN SERPL-MCNC: 9 MG/DL (ref 8.4–25.7)
CALCIUM SERPL-MCNC: 9.7 MG/DL (ref 8.4–10.2)
CHLORIDE SERPL-SCNC: 106 MMOL/L (ref 98–107)
CO2 SERPL-SCNC: 24 MMOL/L (ref 22–29)
COLOR UR AUTO: ABNORMAL
CREAT SERPL-MCNC: 0.85 MG/DL (ref 0.73–1.18)
EOSINOPHIL # BLD AUTO: 0.08 X10(3)/MCL (ref 0–0.9)
EOSINOPHIL NFR BLD AUTO: 0.9 %
ERYTHROCYTE [DISTWIDTH] IN BLOOD BY AUTOMATED COUNT: 13.5 % (ref 11.5–17)
GFR SERPLBLD CREATININE-BSD FMLA CKD-EPI: >60 MLS/MIN/1.73/M2
GLOBULIN SER-MCNC: 3.1 GM/DL (ref 2.4–3.5)
GLUCOSE SERPL-MCNC: 90 MG/DL (ref 74–100)
GLUCOSE UR QL STRIP.AUTO: NORMAL
HCT VFR BLD AUTO: 40.4 % (ref 42–52)
HGB BLD-MCNC: 13.3 G/DL (ref 14–18)
IMM GRANULOCYTES # BLD AUTO: 0.05 X10(3)/MCL (ref 0–0.04)
IMM GRANULOCYTES NFR BLD AUTO: 0.5 %
KETONES UR QL STRIP.AUTO: NEGATIVE
LEUKOCYTE ESTERASE UR QL STRIP.AUTO: NEGATIVE
LYMPHOCYTES # BLD AUTO: 2.08 X10(3)/MCL (ref 0.6–4.6)
LYMPHOCYTES NFR BLD AUTO: 22.1 %
MCH RBC QN AUTO: 27.1 PG (ref 27–31)
MCHC RBC AUTO-ENTMCNC: 32.9 G/DL (ref 33–36)
MCV RBC AUTO: 82.3 FL (ref 80–94)
MONOCYTES # BLD AUTO: 0.54 X10(3)/MCL (ref 0.1–1.3)
MONOCYTES NFR BLD AUTO: 5.7 %
MUCOUS THREADS URNS QL MICRO: ABNORMAL /LPF
NEUTROPHILS # BLD AUTO: 6.59 X10(3)/MCL (ref 2.1–9.2)
NEUTROPHILS NFR BLD AUTO: 70.2 %
NITRITE UR QL STRIP.AUTO: NEGATIVE
NRBC BLD AUTO-RTO: 0 %
PH UR STRIP.AUTO: 7.5 [PH]
PLATELET # BLD AUTO: 330 X10(3)/MCL (ref 130–400)
PMV BLD AUTO: 9.9 FL (ref 7.4–10.4)
POTASSIUM SERPL-SCNC: 4 MMOL/L (ref 3.5–5.1)
PROT SERPL-MCNC: 7.3 GM/DL (ref 6.4–8.3)
PROT UR QL STRIP.AUTO: NEGATIVE
RBC # BLD AUTO: 4.91 X10(6)/MCL (ref 4.7–6.1)
RBC #/AREA URNS AUTO: ABNORMAL /HPF
RBC UR QL AUTO: NEGATIVE
SODIUM SERPL-SCNC: 139 MMOL/L (ref 136–145)
SP GR UR STRIP.AUTO: 1.02 (ref 1–1.03)
SQUAMOUS #/AREA URNS LPF: ABNORMAL /HPF
UROBILINOGEN UR STRIP-ACNC: NORMAL
WBC # SPEC AUTO: 9.4 X10(3)/MCL (ref 4.5–11.5)
WBC #/AREA URNS AUTO: ABNORMAL /HPF

## 2024-02-15 PROCEDURE — 3075F SYST BP GE 130 - 139MM HG: CPT | Mod: CPTII,,, | Performed by: INTERNAL MEDICINE

## 2024-02-15 PROCEDURE — 85025 COMPLETE CBC W/AUTO DIFF WBC: CPT

## 2024-02-15 PROCEDURE — 3008F BODY MASS INDEX DOCD: CPT | Mod: CPTII,,, | Performed by: INTERNAL MEDICINE

## 2024-02-15 PROCEDURE — 3079F DIAST BP 80-89 MM HG: CPT | Mod: CPTII,,, | Performed by: INTERNAL MEDICINE

## 2024-02-15 PROCEDURE — 81001 URINALYSIS AUTO W/SCOPE: CPT

## 2024-02-15 PROCEDURE — 36415 COLL VENOUS BLD VENIPUNCTURE: CPT

## 2024-02-15 PROCEDURE — 80053 COMPREHEN METABOLIC PANEL: CPT

## 2024-02-15 PROCEDURE — 1159F MED LIST DOCD IN RCRD: CPT | Mod: CPTII,,, | Performed by: INTERNAL MEDICINE

## 2024-02-15 PROCEDURE — 1160F RVW MEDS BY RX/DR IN RCRD: CPT | Mod: CPTII,,, | Performed by: INTERNAL MEDICINE

## 2024-02-15 PROCEDURE — 99214 OFFICE O/P EST MOD 30 MIN: CPT | Mod: ,,, | Performed by: INTERNAL MEDICINE

## 2024-02-15 NOTE — ASSESSMENT & PLAN NOTE
Tenderness on examination   CT abdomen pelvis ordered with and without contrast   Laboratory and urinalysis ordered also   Follow-up scheduled  Clinically he has not toxic or ill

## 2024-02-15 NOTE — PROGRESS NOTES
Kumar Whipple MD        PATIENT NAME: John Paul Mak  : 1970  DATE: 2/15/24  MRN: [id number]      Billing Provider: Kumar Whipple MD  Level of Service: MO OFFICE/OUTPT VISIT, EST, LEVL IV, 30-39 MIN  Patient PCP Information       Provider PCP Type    Kumar Whipple MD General            Reason for Visit / Chief Complaint: Abdominal Pain (Lower abd.)       Update PCP  Update Chief Complaint         History of Present Illness / Problem Focused Workflow     John Paul Mak presents to the clinic with Abdominal Pain (Lower abd.)     John Paul is here for abdominal pain   He is developed pain in his left lower abdomen over the last number of weeks that comes and goes   Is not affect his bowels but it is increasing in painful illness  Proximally month ago he had bilateral inguinal hernia repair.        Review of Systems   Review of Systems   Constitutional: Negative.    HENT: Negative.     Eyes: Negative.    Respiratory: Negative.     Cardiovascular: Negative.    Gastrointestinal:  Positive for abdominal pain.   Endocrine: Negative.    Genitourinary: Negative.    Musculoskeletal: Negative.    Integumentary:  Negative.   Neurological: Negative.    Psychiatric/Behavioral: Negative.          Medical / Social / Family History     Past Medical History:   Diagnosis Date    Essential (primary) hypertension        Past Surgical History:   Procedure Laterality Date    COLONOSCOPY  10/28/2020    Harshal Dempsey    INGUINAL HERNIA REPAIR Left        Social History  Mr. Mak  reports that he has never smoked. He has never used smokeless tobacco. He reports current alcohol use. He reports that he does not use drugs.    Family History  Mr.'s Mak  family history includes Diabetes in his brother, mother, and sister; Hypertension in his brother and sister.    Medications and Allergies     Medications  Outpatient Medications Marked as Taking for the 2/15/24 encounter (Office Visit) with Kerrie  Kumar GARCIA MD   Medication Sig Dispense Refill    losartan (COZAAR) 25 MG tablet TAKE 1 TABLET(25 MG) BY MOUTH EVERY DAY 90 tablet 3       Allergies  Review of patient's allergies indicates:  No Known Allergies    Physical Examination     Vitals:    02/15/24 1016   BP: 130/80   Pulse: 62   Temp: 98 °F (36.7 °C)     Physical Exam  Vitals reviewed.   Constitutional:       Appearance: Normal appearance.   HENT:      Head: Normocephalic.      Right Ear: Tympanic membrane normal.      Left Ear: Tympanic membrane normal.      Nose: Nose normal.      Mouth/Throat:      Pharynx: Oropharynx is clear.   Eyes:      Extraocular Movements: Extraocular movements intact.      Pupils: Pupils are equal, round, and reactive to light.   Cardiovascular:      Rate and Rhythm: Normal rate and regular rhythm.      Pulses: Normal pulses.      Heart sounds: Normal heart sounds.   Pulmonary:      Effort: Pulmonary effort is normal.      Breath sounds: Normal breath sounds.   Abdominal:      General: Abdomen is flat. Bowel sounds are normal.      Palpations: Abdomen is soft.          Comments: Tenderness in the left lower quadrant of the abdomen   Musculoskeletal:         General: Normal range of motion.      Cervical back: Normal range of motion.   Skin:     General: Skin is warm and dry.   Neurological:      General: No focal deficit present.      Mental Status: He is alert and oriented to person, place, and time.   Psychiatric:         Mood and Affect: Mood normal.         Behavior: Behavior normal.          Assessment and Plan (including Health Maintenance)      Problem List  Smart Sets  Document Outside HM   :    Plan:    ICD-10-CM ICD-9-CM   1. Generalized abdominal pain  R10.84 789.07   2. Left lower quadrant abdominal pain  R10.32 789.04   3. Diverticulitis  K57.92 562.11       Problem List Items Addressed This Visit          GI    Generalized abdominal pain - Primary    Left lower quadrant abdominal pain     Tenderness on examination    CT abdomen pelvis ordered with and without contrast   Laboratory and urinalysis ordered also   Follow-up scheduled  Clinically he has not toxic or ill         Diverticulitis              Health Maintenance Due   Topic Date Due    Hepatitis C Screening  Never done    HIV Screening  Never done    TETANUS VACCINE  Never done    Hemoglobin A1c (Diabetic Prevention Screening)  Never done    Shingles Vaccine (1 of 2) Never done    Influenza Vaccine (1) Never done    COVID-19 Vaccine (3 - 2023-24 season) 09/01/2023       Problem List Items Addressed This Visit          GI    Generalized abdominal pain - Primary    Left lower quadrant abdominal pain    Current Assessment & Plan     Tenderness on examination   CT abdomen pelvis ordered with and without contrast   Laboratory and urinalysis ordered also   Follow-up scheduled  Clinically he has not toxic or ill         Diverticulitis       Health Maintenance Topics with due status: Not Due       Topic Last Completion Date    Colorectal Cancer Screening 10/28/2020    Lipid Panel 11/09/2023       Future Appointments   Date Time Provider Department Center   2/29/2024  3:20 PM Kumar Sy MD [facility] [place] [id number]   11/25/2024  8:00 AM Kumar Sy MD [place] [address]            Signature:  Kumar Sy MD  [facility]  [address] [facility] [place] [address]    Date of encounter: 2/15/24

## 2024-02-16 ENCOUNTER — TELEPHONE (OUTPATIENT)
Dept: INTERNAL MEDICINE | Facility: CLINIC | Age: 54
End: 2024-02-16
Payer: COMMERCIAL

## 2024-02-29 ENCOUNTER — OFFICE VISIT (OUTPATIENT)
Dept: INTERNAL MEDICINE | Facility: CLINIC | Age: 54
End: 2024-02-29
Payer: COMMERCIAL

## 2024-02-29 VITALS
SYSTOLIC BLOOD PRESSURE: 132 MMHG | HEIGHT: 67 IN | OXYGEN SATURATION: 98 % | WEIGHT: 209 LBS | DIASTOLIC BLOOD PRESSURE: 76 MMHG | BODY MASS INDEX: 32.8 KG/M2 | HEART RATE: 86 BPM

## 2024-02-29 DIAGNOSIS — R10.84 GENERALIZED ABDOMINAL PAIN: Primary | ICD-10-CM

## 2024-02-29 PROCEDURE — 3078F DIAST BP <80 MM HG: CPT | Mod: CPTII,,, | Performed by: INTERNAL MEDICINE

## 2024-02-29 PROCEDURE — 1160F RVW MEDS BY RX/DR IN RCRD: CPT | Mod: CPTII,,, | Performed by: INTERNAL MEDICINE

## 2024-02-29 PROCEDURE — 3008F BODY MASS INDEX DOCD: CPT | Mod: CPTII,,, | Performed by: INTERNAL MEDICINE

## 2024-02-29 PROCEDURE — 3075F SYST BP GE 130 - 139MM HG: CPT | Mod: CPTII,,, | Performed by: INTERNAL MEDICINE

## 2024-02-29 PROCEDURE — 99213 OFFICE O/P EST LOW 20 MIN: CPT | Mod: ,,, | Performed by: INTERNAL MEDICINE

## 2024-02-29 PROCEDURE — 1159F MED LIST DOCD IN RCRD: CPT | Mod: CPTII,,, | Performed by: INTERNAL MEDICINE

## 2024-02-29 NOTE — PROGRESS NOTES
Kumar Sy MD        PATIENT NAME: John Paul Mak Jr.  : 1970  DATE: 24  MRN: [id number]      Billing Provider: Kumar Sy MD  Level of Service: [place] OFFICE/OUTPT VISIT, EST, LEVL III, 20-29 MIN  Patient PCP Information       Provider PCP Type    Kumar Sy MD General            Reason for Visit / Chief Complaint: Follow-up (2 wk f/u/)       Update PCP  Update Chief Complaint         History of Present Illness / Problem Focused Workflow     John Paul Mak Jr. presents to the clinic with Follow-up (2 wk f/u/)     Is here follow-up visit for his abdominal discomfort   He locates the discomfort in the left lower quadrant it is intermittent   His much better than it was 2 weeks ago.  His last colonoscopy was 5 years ago        Review of Systems   Review of Systems   Constitutional: Negative.    HENT: Negative.     Eyes: Negative.    Respiratory: Negative.     Cardiovascular: Negative.    Gastrointestinal: Negative.    Endocrine: Negative.    Genitourinary: Negative.    Musculoskeletal: Negative.    Integumentary:  Negative.   Neurological: Negative.    Psychiatric/Behavioral: Negative.          Medical / Social / Family History     Past Medical History:   Diagnosis Date    Essential (primary) hypertension        Past Surgical History:   Procedure Laterality Date    COLONOSCOPY  10/28/2020    Harshal Dempsey    INGUINAL HERNIA REPAIR Left        Social History  Mr. Mak  reports that he has never smoked. He has never used smokeless tobacco. He reports current alcohol use. He reports that he does not use drugs.    Family History  Mr.'s Mak  family history includes Diabetes in his brother, mother, and sister; Hypertension in his brother and sister.    Medications and Allergies     Medications  Outpatient Medications Marked as Taking for the 24 encounter (Office Visit) with Kumar Sy MD   Medication Sig Dispense Refill    losartan (COZAAR) 25 MG tablet  TAKE 1 TABLET(25 MG) BY MOUTH EVERY DAY 90 tablet 3       Allergies  Review of patient's allergies indicates:  No Known Allergies    Physical Examination     Vitals:    02/29/24 1529   BP: 132/76   Pulse: 86     Physical Exam  Vitals reviewed.   Constitutional:       Appearance: Normal appearance.   HENT:      Head: Normocephalic.      Right Ear: Tympanic membrane normal.      Left Ear: Tympanic membrane normal.      Nose: Nose normal.      Mouth/Throat:      Pharynx: Oropharynx is clear.   Eyes:      Extraocular Movements: Extraocular movements intact.      Pupils: Pupils are equal, round, and reactive to light.   Cardiovascular:      Rate and Rhythm: Normal rate and regular rhythm.      Pulses: Normal pulses.      Heart sounds: Normal heart sounds.   Pulmonary:      Effort: Pulmonary effort is normal.      Breath sounds: Normal breath sounds.   Abdominal:      General: Abdomen is flat. Bowel sounds are normal.      Palpations: Abdomen is soft.      Comments: No tenderness specifically noted   No hernia found on hernia examination   Musculoskeletal:         General: Normal range of motion.      Cervical back: Normal range of motion.   Skin:     General: Skin is warm and dry.   Neurological:      General: No focal deficit present.      Mental Status: He is alert and oriented to person, place, and time.   Psychiatric:         Mood and Affect: Mood normal.         Behavior: Behavior normal.          Assessment and Plan (including Health Maintenance)      Problem List  Smart Sets  Document Outside HM   :    Plan:    ICD-10-CM ICD-9-CM   1. Generalized abdominal pain  R10.84 789.07       Problem List Items Addressed This Visit          GI    Generalized abdominal pain - Primary (Chronic)     Left lower quadrant abdominal pain is much diminished and almost gone   The results of the CT abdomen pelvis normal without any evidence pathology   Laboratory urinary normal   Plans for him to monitor this situation if it returns  or  increases in severity to call us back.                   Health Maintenance Due   Topic Date Due    Hepatitis C Screening  Never done    HIV Screening  Never done    TETANUS VACCINE  Never done    Hemoglobin A1c (Diabetic Prevention Screening)  Never done    Shingles Vaccine (1 of 2) Never done    COVID-19 Vaccine (3 - 2023-24 season) 09/01/2023       Problem List Items Addressed This Visit          GI    Generalized abdominal pain - Primary (Chronic)    Current Assessment & Plan     Left lower quadrant abdominal pain is much diminished and almost gone   The results of the CT abdomen pelvis normal without any evidence pathology   Laboratory urinary normal   Plans for him to monitor this situation if it returns or  increases in severity to call us back.            Health Maintenance Topics with due status: Not Due       Topic Last Completion Date    Colorectal Cancer Screening 10/28/2020    Lipid Panel 11/09/2023       Future Appointments   Date Time Provider Department Center   11/18/2024  3:20 PM Kumar Sy MD [place] [address]            Signature:  Kumar Sy MD  [facility]  [facility] [facility] [address]    Date of encounter: 2/29/24

## 2024-10-10 RX ORDER — CIPROFLOXACIN AND DEXAMETHASONE 3; 1 MG/ML; MG/ML
2 SUSPENSION/ DROPS AURICULAR (OTIC) 4 TIMES DAILY
Qty: 7.5 ML | Refills: 0 | Status: SHIPPED | OUTPATIENT
Start: 2024-10-10

## 2024-10-10 NOTE — TELEPHONE ENCOUNTER
Please send out the dexamethasone antibiotic ear drop we sent out on a patient earlier this week 2 drops 4 times a day to the affected ear

## 2024-10-10 NOTE — TELEPHONE ENCOUNTER
pt stated he has been having an earache for the past 2 days, and is wondering if something can be called into pharmacy. Please advise

## 2024-10-10 NOTE — TELEPHONE ENCOUNTER
----- Message from Bob Peng sent at 10/10/2024 11:45 AM CDT -----  Regarding: advice  Who Called: John Paul Mak Jr.    Caller is requesting assistance/information from provider's office.    Symptoms (please be specific):    How long has patient had these symptoms:    List of preferred pharmacies on file (remove unneeded): [unfilled]  If different, enter pharmacy into here including location and phone number:       Preferred Method of Contact: Phone Call  Patient's Preferred Phone Number on File: [phone]   Best Call Back Number, if different:    Additional Information: pt is requesting a call back from nurse, he stated he has been having an earache for the past 2 days, and is wondering if something can be called into pharmacy. Please advise

## 2024-11-15 PROBLEM — Z00.00 ENCOUNTER FOR MEDICARE ANNUAL WELLNESS EXAM: Status: ACTIVE | Noted: 2024-11-15

## 2024-11-15 PROBLEM — Z00.00 WELLNESS EXAMINATION: Status: ACTIVE | Noted: 2024-11-15

## 2024-12-03 ENCOUNTER — TELEPHONE (OUTPATIENT)
Dept: INTERNAL MEDICINE | Facility: CLINIC | Age: 54
End: 2024-12-03
Payer: COMMERCIAL

## 2024-12-03 NOTE — TELEPHONE ENCOUNTER
Patient has an appointment on 12/12  Fasting labs NOT DONE  Please call and remind patient of appointment

## 2024-12-04 ENCOUNTER — LAB VISIT (OUTPATIENT)
Dept: LAB | Facility: HOSPITAL | Age: 54
End: 2024-12-04
Attending: INTERNAL MEDICINE
Payer: COMMERCIAL

## 2024-12-04 DIAGNOSIS — Z00.00 WELLNESS EXAMINATION: ICD-10-CM

## 2024-12-04 LAB
25(OH)D3+25(OH)D2 SERPL-MCNC: 14 NG/ML (ref 30–80)
ALBUMIN SERPL-MCNC: 3.8 G/DL (ref 3.5–5)
ALBUMIN/GLOB SERPL: 0.9 RATIO (ref 1.1–2)
ALP SERPL-CCNC: 68 UNIT/L (ref 40–150)
ALT SERPL-CCNC: 32 UNIT/L (ref 0–55)
ANION GAP SERPL CALC-SCNC: 8 MEQ/L
AST SERPL-CCNC: 26 UNIT/L (ref 5–34)
BASOPHILS # BLD AUTO: 0.03 X10(3)/MCL
BASOPHILS NFR BLD AUTO: 0.3 %
BILIRUB SERPL-MCNC: 0.2 MG/DL
BUN SERPL-MCNC: 12 MG/DL (ref 8.4–25.7)
CALCIUM SERPL-MCNC: 9.9 MG/DL (ref 8.4–10.2)
CHLORIDE SERPL-SCNC: 105 MMOL/L (ref 98–107)
CHOLEST SERPL-MCNC: 194 MG/DL
CHOLEST/HDLC SERPL: 5 {RATIO} (ref 0–5)
CO2 SERPL-SCNC: 26 MMOL/L (ref 22–29)
CREAT SERPL-MCNC: 0.92 MG/DL (ref 0.72–1.25)
CREAT/UREA NIT SERPL: 13
EOSINOPHIL # BLD AUTO: 0.24 X10(3)/MCL (ref 0–0.9)
EOSINOPHIL NFR BLD AUTO: 2.5 %
ERYTHROCYTE [DISTWIDTH] IN BLOOD BY AUTOMATED COUNT: 13.3 % (ref 11.5–17)
GFR SERPLBLD CREATININE-BSD FMLA CKD-EPI: >60 ML/MIN/1.73/M2
GLOBULIN SER-MCNC: 4.3 GM/DL (ref 2.4–3.5)
GLUCOSE SERPL-MCNC: 100 MG/DL (ref 74–100)
HCT VFR BLD AUTO: 42.8 % (ref 42–52)
HDLC SERPL-MCNC: 40 MG/DL (ref 35–60)
HGB BLD-MCNC: 13.7 G/DL (ref 14–18)
IMM GRANULOCYTES # BLD AUTO: 0.05 X10(3)/MCL (ref 0–0.04)
IMM GRANULOCYTES NFR BLD AUTO: 0.5 %
LDLC SERPL CALC-MCNC: 126 MG/DL (ref 50–140)
LYMPHOCYTES # BLD AUTO: 2.9 X10(3)/MCL (ref 0.6–4.6)
LYMPHOCYTES NFR BLD AUTO: 30.2 %
MCH RBC QN AUTO: 26.9 PG (ref 27–31)
MCHC RBC AUTO-ENTMCNC: 32 G/DL (ref 33–36)
MCV RBC AUTO: 84.1 FL (ref 80–94)
MONOCYTES # BLD AUTO: 0.61 X10(3)/MCL (ref 0.1–1.3)
MONOCYTES NFR BLD AUTO: 6.3 %
NEUTROPHILS # BLD AUTO: 5.78 X10(3)/MCL (ref 2.1–9.2)
NEUTROPHILS NFR BLD AUTO: 60.2 %
PLATELET # BLD AUTO: 311 X10(3)/MCL (ref 130–400)
PMV BLD AUTO: 9.7 FL (ref 7.4–10.4)
POTASSIUM SERPL-SCNC: 4.6 MMOL/L (ref 3.5–5.1)
PROT SERPL-MCNC: 8.1 GM/DL (ref 6.4–8.3)
PSA SERPL-MCNC: 1.58 NG/ML
RBC # BLD AUTO: 5.09 X10(6)/MCL (ref 4.7–6.1)
SODIUM SERPL-SCNC: 139 MMOL/L (ref 136–145)
TRIGL SERPL-MCNC: 139 MG/DL (ref 34–140)
VLDLC SERPL CALC-MCNC: 28 MG/DL
WBC # BLD AUTO: 9.61 X10(3)/MCL (ref 4.5–11.5)

## 2024-12-04 PROCEDURE — 82306 VITAMIN D 25 HYDROXY: CPT

## 2024-12-04 PROCEDURE — 36415 COLL VENOUS BLD VENIPUNCTURE: CPT

## 2024-12-04 PROCEDURE — 80053 COMPREHEN METABOLIC PANEL: CPT

## 2024-12-04 PROCEDURE — 84153 ASSAY OF PSA TOTAL: CPT

## 2024-12-04 PROCEDURE — 80061 LIPID PANEL: CPT

## 2024-12-04 PROCEDURE — 85025 COMPLETE CBC W/AUTO DIFF WBC: CPT

## 2024-12-12 ENCOUNTER — OFFICE VISIT (OUTPATIENT)
Dept: INTERNAL MEDICINE | Facility: CLINIC | Age: 54
End: 2024-12-12
Payer: COMMERCIAL

## 2024-12-12 VITALS
OXYGEN SATURATION: 98 % | DIASTOLIC BLOOD PRESSURE: 88 MMHG | HEIGHT: 67 IN | HEART RATE: 71 BPM | SYSTOLIC BLOOD PRESSURE: 140 MMHG | RESPIRATION RATE: 18 BRPM | BODY MASS INDEX: 35 KG/M2 | WEIGHT: 223 LBS

## 2024-12-12 DIAGNOSIS — I10 ESSENTIAL (PRIMARY) HYPERTENSION: Chronic | ICD-10-CM

## 2024-12-12 DIAGNOSIS — R79.89 LOW VITAMIN D LEVEL: Chronic | ICD-10-CM

## 2024-12-12 DIAGNOSIS — Z12.11 SCREENING FOR MALIGNANT NEOPLASM OF COLON: ICD-10-CM

## 2024-12-12 DIAGNOSIS — Z00.00 WELLNESS EXAMINATION: Primary | ICD-10-CM

## 2024-12-12 PROCEDURE — 1160F RVW MEDS BY RX/DR IN RCRD: CPT | Mod: CPTII,,, | Performed by: INTERNAL MEDICINE

## 2024-12-12 PROCEDURE — 3008F BODY MASS INDEX DOCD: CPT | Mod: CPTII,,, | Performed by: INTERNAL MEDICINE

## 2024-12-12 PROCEDURE — 3079F DIAST BP 80-89 MM HG: CPT | Mod: CPTII,,, | Performed by: INTERNAL MEDICINE

## 2024-12-12 PROCEDURE — 3077F SYST BP >= 140 MM HG: CPT | Mod: CPTII,,, | Performed by: INTERNAL MEDICINE

## 2024-12-12 PROCEDURE — 99396 PREV VISIT EST AGE 40-64: CPT | Mod: ,,, | Performed by: INTERNAL MEDICINE

## 2024-12-12 PROCEDURE — 1159F MED LIST DOCD IN RCRD: CPT | Mod: CPTII,,, | Performed by: INTERNAL MEDICINE

## 2024-12-12 RX ORDER — LOSARTAN POTASSIUM 50 MG/1
50 TABLET ORAL DAILY
Qty: 90 TABLET | Refills: 3 | Status: SHIPPED | OUTPATIENT
Start: 2024-12-12 | End: 2025-12-12

## 2024-12-12 NOTE — ASSESSMENT & PLAN NOTE
Blood pressure is elevated when I took it 160/90  Discussed hypertension and long-term effects   Increase his Cozaar to 50 mg a day with blood pressure check in one-month.

## 2024-12-12 NOTE — PROGRESS NOTES
Kumar Sy MD        PATIENT NAME: John Paul Mak Jr.  : 1970  DATE: 24  MRN: [id number]      Billing Provider: Kumar Sy MD  Level of Service: ID PREVENTIVE VISIT,EST,40-64  Patient PCP Information       Provider PCP Type    Kumar Sy MD General            Reason for Visit / Chief Complaint: Annual Exam       Update PCP  Update Chief Complaint         History of Present Illness / Problem Focused Workflow     John Paul Mak Jr. presents to the clinic with Annual Exam     Patient is here for his annual wellness exam   He is doing well with no issues        Review of Systems   Review of Systems   Constitutional: Negative.    HENT: Negative.     Eyes: Negative.    Respiratory: Negative.     Cardiovascular: Negative.    Gastrointestinal: Negative.    Endocrine: Negative.    Genitourinary: Negative.    Musculoskeletal: Negative.    Integumentary:  Negative.   Neurological: Negative.    Psychiatric/Behavioral: Negative.          Medical / Social / Family History     Past Medical History:   Diagnosis Date    Essential (primary) hypertension        Past Surgical History:   Procedure Laterality Date    COLONOSCOPY  10/28/2020    Harshal Dempsey    INGUINAL HERNIA REPAIR Left        Social History  Mr. Mak  reports that he has never smoked. He has never used smokeless tobacco. He reports current alcohol use. He reports that he does not use drugs.    Family History  Mr.'s Mak  family history includes Diabetes in his brother, mother, and sister; Hypertension in his brother and sister.    Medications and Allergies     Medications  Outpatient Medications Marked as Taking for the 24 encounter (Office Visit) with Kumar Sy MD   Medication Sig Dispense Refill    losartan (COZAAR) 25 MG tablet TAKE 1 TABLET(25 MG) BY MOUTH EVERY DAY 90 tablet 3       Allergies  Review of patient's allergies indicates:  No Known Allergies    Physical Examination      Vitals:    12/12/24 0827   BP: (!) 140/88   Pulse:    Resp:      Physical Exam  Vitals reviewed.   Constitutional:       Appearance: Normal appearance.   HENT:      Head: Normocephalic.      Right Ear: Tympanic membrane normal.      Left Ear: Tympanic membrane normal.      Nose: Nose normal.      Mouth/Throat:      Pharynx: Oropharynx is clear.   Eyes:      Extraocular Movements: Extraocular movements intact.      Pupils: Pupils are equal, round, and reactive to light.   Cardiovascular:      Rate and Rhythm: Normal rate and regular rhythm.      Pulses: Normal pulses.      Heart sounds: Normal heart sounds.   Pulmonary:      Effort: Pulmonary effort is normal.      Breath sounds: Normal breath sounds.   Abdominal:      General: Abdomen is flat. Bowel sounds are normal.      Palpations: Abdomen is soft.   Genitourinary:     Testes: Normal.      Prostate: Normal.      Rectum: Normal.   Musculoskeletal:         General: Normal range of motion.      Cervical back: Normal range of motion.   Skin:     General: Skin is warm and dry.   Neurological:      General: No focal deficit present.      Mental Status: He is alert and oriented to person, place, and time.   Psychiatric:         Mood and Affect: Mood normal.         Behavior: Behavior normal.          Assessment and Plan (including Health Maintenance)      Problem List  Smart Sets  Document Outside HM   :    Plan:    ICD-10-CM ICD-9-CM   1. Wellness examination  Z00.00 V70.0   2. Low vitamin D level  R79.89 790.6   3. Essential (primary) hypertension  I10 401.9   4. Screening for malignant neoplasm of colon  Z12.11 V76.51       Problem List Items Addressed This Visit          Cardiac/Vascular    Essential (primary) hypertension (Chronic)     Blood pressure is elevated when I took it 160/90  Discussed hypertension and long-term effects   Increase his Cozaar to 50 mg a day with blood pressure check in one-month.            Endocrine    Low vitamin D level (Chronic)      Begin vitamin-D replacement 2000 units daily            Other    Wellness examination - Primary     Discussed results of laboratory examination  Colonoscopy scheduled   Revisit 1 year.          Other Visit Diagnoses       Screening for malignant neoplasm of colon                       Health Maintenance Due   Topic Date Due    Hepatitis C Screening  Never done    HIV Screening  Never done    TETANUS VACCINE  Never done    Hemoglobin A1c (Diabetic Prevention Screening)  Never done    Shingles Vaccine (1 of 2) Never done    Influenza Vaccine (1) 09/01/2024    COVID-19 Vaccine (3 - 2024-25 season) 09/01/2024       Problem List Items Addressed This Visit          Cardiac/Vascular    Essential (primary) hypertension (Chronic)    Current Assessment & Plan     Blood pressure is elevated when I took it 160/90  Discussed hypertension and long-term effects   Increase his Cozaar to 50 mg a day with blood pressure check in one-month.            Endocrine    Low vitamin D level (Chronic)    Current Assessment & Plan     Begin vitamin-D replacement 2000 units daily            Other    Wellness examination - Primary    Current Assessment & Plan     Discussed results of laboratory examination  Colonoscopy scheduled   Revisit 1 year.          Other Visit Diagnoses       Screening for malignant neoplasm of colon                Health Maintenance Topics with due status: Not Due       Topic Last Completion Date    Colorectal Cancer Screening 10/28/2020    Lipid Panel 12/04/2024    RSV Vaccine (Age 60+ and Pregnant patients) Not Due       Future Appointments   Date Time Provider Department Center   1/13/2025  8:00 AM NURSE, [facility] INTERNAL MEDICINE [place] [id number]   12/18/2025  8:00 AM Kumar Sy MD [place] [id number]            Signature:  Kumar Sy MD  [facility]  [facility] [facility] [address]    Date of encounter: 12/12/24

## 2025-01-07 ENCOUNTER — PATIENT OUTREACH (OUTPATIENT)
Facility: CLINIC | Age: 55
End: 2025-01-07
Payer: COMMERCIAL

## 2025-01-07 NOTE — PROGRESS NOTES
Population Health Outreach.    Health Maintenance Topic(s) Outreach Outcomes & Actions Taken:  Blood Pressure - Outreach Outcomes & Actions Taken  : Primary Care Follow Up Visit Scheduled      Additional Notes:  BP: 140/88 Abnormal   as of 12/12/2024     Future Appointments    Encounter Information   Provider Department Center   1/13/2025 8:00 [facility]    Nurse BP check <1 week away.

## 2025-01-31 ENCOUNTER — TELEPHONE (OUTPATIENT)
Dept: INTERNAL MEDICINE | Facility: CLINIC | Age: 55
End: 2025-01-31
Payer: COMMERCIAL

## 2025-01-31 NOTE — TELEPHONE ENCOUNTER
----- Message from Zainab sent at 1/31/2025  9:12 AM CST -----  Who Called: John Paul Mak Jr.    Caller is requesting assistance/information from provider's office.    Symptoms (please be specific): n/a   How long has patient had these symptoms:  n/a  List of preferred pharmacies on file (remove unneeded): [unfilled]  If different, enter pharmacy into here including location and phone number: n/a      Preferred Method of Contact: Phone Call  Patient's Preferred Phone Number on File: [phone]   Best Call Back Number, if different:  Additional Information: medical advice, pt called to confirm status on referral for gastro, please advise, thanks

## 2025-03-13 ENCOUNTER — TELEPHONE (OUTPATIENT)
Dept: INTERNAL MEDICINE | Facility: CLINIC | Age: 55
End: 2025-03-13
Payer: COMMERCIAL

## 2025-03-20 DIAGNOSIS — Z00.00 WELLNESS EXAMINATION: ICD-10-CM

## 2025-03-20 RX ORDER — LOSARTAN POTASSIUM 50 MG/1
50 TABLET ORAL DAILY
Qty: 90 TABLET | Refills: 3 | Status: SHIPPED | OUTPATIENT
Start: 2025-03-20 | End: 2026-03-20

## 2025-03-20 NOTE — TELEPHONE ENCOUNTER
Patient needed a refill on his Losartan 50 mg daily. Advised patient that it was sent in to his preferred pharmacy. He voiced understanding and thanks

## 2025-07-07 ENCOUNTER — PATIENT OUTREACH (OUTPATIENT)
Facility: CLINIC | Age: 55
End: 2025-07-07
Payer: COMMERCIAL

## 2025-07-07 NOTE — PROGRESS NOTES
Population Health Outreach.    Health Maintenance up to date.   1 very slightly high /88. Other EPIC storyboard BP's within normal and he has a scheduled PCP f/u.

## 2025-07-11 ENCOUNTER — OFFICE VISIT (OUTPATIENT)
Dept: INTERNAL MEDICINE | Facility: CLINIC | Age: 55
End: 2025-07-11
Payer: COMMERCIAL

## 2025-07-11 VITALS
OXYGEN SATURATION: 98 % | SYSTOLIC BLOOD PRESSURE: 132 MMHG | HEIGHT: 67 IN | DIASTOLIC BLOOD PRESSURE: 74 MMHG | WEIGHT: 222 LBS | HEART RATE: 64 BPM | BODY MASS INDEX: 34.84 KG/M2

## 2025-07-11 DIAGNOSIS — M79.2 RADICULAR PAIN IN RIGHT ARM: Primary | ICD-10-CM

## 2025-07-11 DIAGNOSIS — I10 ESSENTIAL (PRIMARY) HYPERTENSION: Chronic | ICD-10-CM

## 2025-07-11 PROBLEM — E55.9 VITAMIN D DEFICIENCY: Chronic | Status: ACTIVE | Noted: 2023-11-16

## 2025-07-11 RX ORDER — PREDNISONE 20 MG/1
20 TABLET ORAL DAILY
Qty: 5 TABLET | Refills: 0 | Status: SHIPPED | OUTPATIENT
Start: 2025-07-11 | End: 2025-07-16

## 2025-07-11 RX ORDER — DICLOFENAC SODIUM 75 MG/1
75 TABLET, DELAYED RELEASE ORAL 2 TIMES DAILY PRN
Qty: 14 TABLET | Refills: 0 | Status: SHIPPED | OUTPATIENT
Start: 2025-07-11 | End: 2025-07-18

## 2025-07-11 NOTE — PROGRESS NOTES
"   Patient ID: John Paul Mak Jr. is a 55 y.o. male.    Chief Complaint: Arm Pain (Right arm pain. Started a few months ago and getting progressively worse. Arm feels "cold and throbs at night: reports his fingers feel numb at times. Denies any trauma. )    John Paul Mak Jr. is a 55 y.o. male, known to Dr Sy, presents today for a requested visit.  Medical comorbidities include HTN, Vitamin-D deficiency.    History of Present Illness    Patient presents today with downward radiating right arm pain and intermittent tingling and numbness in his fingers.  Symptoms reported to be worse at night, discomfort interrupting sleep occasionally.  Onset of symptoms greater than 4 weeks, however described gradually worsening.  No limitations to range of motion or extremity weakness.  Denies shoulder, elbow, or neck pain.  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       Wellness:12/12/24    MEDICAL HISTORY:    Past Medical History:   Diagnosis Date    Essential (primary) hypertension     Vitamin D deficiency 11/16/2023      Past Surgical History:   Procedure Laterality Date    COLONOSCOPY  10/28/2020    Harshal Dempsey    INGUINAL HERNIA REPAIR Left       Social History[1]       Health Maintenance Due   Topic Date Due    Hepatitis C Screening  Never done    HIV Screening  Never done    TETANUS VACCINE  Never done    Hemoglobin A1c (Diabetic Prevention Screening)  Never done    Shingles Vaccine (1 of 2) Never done    Pneumococcal Vaccines (Age 50+) (1 of 1 - PCV) Never done    COVID-19 Vaccine (3 - 2024-25 season) 09/01/2024          Patient Care Team:  Kumar Sy MD as PCP - General (Internal Medicine)      Review of Systems   Constitutional:  Negative for fatigue and fever.   HENT:  Negative for congestion, rhinorrhea, sore throat and trouble swallowing.    Eyes:  Negative for redness and visual " "disturbance.   Respiratory:  Negative for cough, chest tightness and shortness of breath.    Cardiovascular:  Negative for chest pain and palpitations.   Gastrointestinal:  Negative for abdominal pain, constipation, diarrhea, nausea and vomiting.   Genitourinary:  Negative for dysuria, flank pain, frequency and urgency.   Musculoskeletal:  Positive for arthralgias. Negative for gait problem and myalgias.   Skin:  Negative for rash and wound.   Neurological:  Negative for facial asymmetry, speech difficulty, weakness and headaches.   All other systems reviewed and are negative.      Objective:   /74 (BP Location: Right arm, Patient Position: Sitting)   Pulse 64   Ht 5' 7" (1.702 m)   Wt 100.7 kg (222 lb)   SpO2 98%   BMI 34.77 kg/m²      Physical Exam  Constitutional:       General: He is not in acute distress.     Appearance: Normal appearance.   HENT:      Right Ear: Tympanic membrane, ear canal and external ear normal.      Left Ear: Tympanic membrane, ear canal and external ear normal.      Nose: Nose normal.      Mouth/Throat:      Mouth: Mucous membranes are moist.      Pharynx: Oropharynx is clear.   Eyes:      Extraocular Movements: Extraocular movements intact.      Conjunctiva/sclera: Conjunctivae normal.      Pupils: Pupils are equal, round, and reactive to light.   Cardiovascular:      Rate and Rhythm: Normal rate and regular rhythm.      Pulses: Normal pulses.      Heart sounds: Normal heart sounds. No murmur heard.     No gallop.   Pulmonary:      Effort: Pulmonary effort is normal.      Breath sounds: Normal breath sounds. No wheezing.   Abdominal:      General: Bowel sounds are normal. There is no distension.      Palpations: Abdomen is soft. There is no mass.      Tenderness: There is no abdominal tenderness. There is no guarding.   Musculoskeletal:         General: Normal range of motion.      Right upper arm: Tenderness present. No swelling or bony tenderness.      Right hand: Normal " range of motion. Decreased sensation.   Skin:     General: Skin is warm and dry.   Neurological:      Mental Status: He is alert. Mental status is at baseline.      Sensory: No sensory deficit.      Motor: No weakness.           Assessment:       ICD-10-CM ICD-9-CM   1. Radicular pain in right arm  M79.2 729.2   2. Essential (primary) hypertension  I10 401.9        Plan:   1. Radicular pain in right arm  Assessment & Plan:  -subacute   -radiating down right arm with numbness to fingers  -denies neck/shoulder/elbow/wrist pain  -initiate trial diclofenac b.i.d. p.r.n.   -initiate prednisone 20 mg q.d. x5 days   -if persist and/or worsens consider nerve conduction study and/or imaging of neck/shoulder.  Also consider physical therapy as well    Orders:  -     diclofenac (VOLTAREN) 75 MG EC tablet; Take 1 tablet (75 mg total) by mouth 2 (two) times daily as needed (pain).  Dispense: 14 tablet; Refill: 0  -     predniSONE (DELTASONE) 20 MG tablet; Take 1 tablet (20 mg total) by mouth once daily. for 5 days  Dispense: 5 tablet; Refill: 0    2. Essential (primary) hypertension  Assessment & Plan:  BP Readings from Last 1 Encounters:   07/11/25 132/74      -stable   -currently on:    losartan - 50 MG  -encourage Low Sodium Diet (DASH Diet - Less than 2 grams of sodium per day).  -encouraged to Monitor blood pressure routinely and log              Follow up for Previously scheduled and PRN if need.   -plan specifics discussed above    Orders Placed This Encounter    diclofenac (VOLTAREN) 75 MG EC tablet    predniSONE (DELTASONE) 20 MG tablet        Medication List with Changes/Refills   New Medications    DICLOFENAC (VOLTAREN) 75 MG EC TABLET    Take 1 tablet (75 mg total) by mouth 2 (two) times daily as needed (pain).    PREDNISONE (DELTASONE) 20 MG TABLET    Take 1 tablet (20 mg total) by mouth once daily. for 5 days   Current Medications    LOSARTAN (COZAAR) 50 MG TABLET    Take 1 tablet (50 mg total) by mouth once daily.       This note was generated with the assistance of ambient listening technology. I attest to having reviewed and edited the generated note for accuracy, though some syntax or spelling errors may persist. Please contact the author of this note for any clarification.          [1]   Social History  Tobacco Use    Smoking status: Never    Smokeless tobacco: Never   Substance Use Topics    Alcohol use: Yes    Drug use: Never

## 2025-07-11 NOTE — ASSESSMENT & PLAN NOTE
-subacute   -radiating down right arm with numbness to fingers  -denies neck/shoulder/elbow/wrist pain  -initiate trial diclofenac b.i.d. p.r.n.   -initiate prednisone 20 mg q.d. x5 days   -if persist and/or worsens consider nerve conduction study and/or imaging of neck/shoulder.  Also consider physical therapy as well  
BP Readings from Last 1 Encounters:   07/11/25 132/74      -stable   -currently on:    losartan - 50 MG  -encourage Low Sodium Diet (DASH Diet - Less than 2 grams of sodium per day).  -encouraged to Monitor blood pressure routinely and log    
Lab Results   Component Value Date    GNFNDJQA54IU 14 (L) 12/04/2024      -currently on   
Lab Results   Component Value Date    HGB 13.7 (L) 12/04/2024    HCT 42.8 12/04/2024    MCV 84.1 12/04/2024    RBC 5.09 12/04/2024      -stable    
<<----- Click to add NO significant Past Surgical History

## 2025-07-17 ENCOUNTER — TELEPHONE (OUTPATIENT)
Dept: INTERNAL MEDICINE | Facility: CLINIC | Age: 55
End: 2025-07-17
Payer: COMMERCIAL

## 2025-07-17 NOTE — TELEPHONE ENCOUNTER
Copied from CRM #[id number]. Topic: General Inquiry - Patient Advice  >> Jul 17, 2025  1:02 PM Omi wrote:  Who Called: John Paul Mak Jr.    Caller is requesting assistance/information from provider's office.    Symptoms (please be specific):    How long has patient had these symptoms:    List of preferred pharmacies on file (remove unneeded): [unfilled]  If different, enter pharmacy into here including location and phone number:      Preferred Method of Contact: Phone Call  Patient's Preferred Phone Number on File: [phone]   Best Call Back Number, if different:  Additional Information: called to req written statement , confirming reason for last visit and what medications were prescribed , pt would like to  , please call when ready for

## 2025-08-12 ENCOUNTER — TELEPHONE (OUTPATIENT)
Dept: INTERNAL MEDICINE | Facility: CLINIC | Age: 55
End: 2025-08-12
Payer: COMMERCIAL

## 2025-08-28 ENCOUNTER — TELEPHONE (OUTPATIENT)
Dept: INTERNAL MEDICINE | Facility: CLINIC | Age: 55
End: 2025-08-28
Payer: COMMERCIAL

## 2025-08-29 ENCOUNTER — TELEPHONE (OUTPATIENT)
Dept: INTERNAL MEDICINE | Facility: CLINIC | Age: 55
End: 2025-08-29
Payer: COMMERCIAL

## 2025-09-02 ENCOUNTER — TELEPHONE (OUTPATIENT)
Dept: INTERNAL MEDICINE | Facility: CLINIC | Age: 55
End: 2025-09-02
Payer: COMMERCIAL

## 2025-09-02 DIAGNOSIS — Z00.00 WELLNESS EXAMINATION: ICD-10-CM

## 2025-09-02 RX ORDER — LOSARTAN POTASSIUM 50 MG/1
50 TABLET ORAL DAILY
Qty: 90 TABLET | Refills: 3 | Status: SHIPPED | OUTPATIENT
Start: 2025-09-02 | End: 2026-09-02